# Patient Record
Sex: MALE | Race: WHITE | Employment: FULL TIME | URBAN - METROPOLITAN AREA
[De-identification: names, ages, dates, MRNs, and addresses within clinical notes are randomized per-mention and may not be internally consistent; named-entity substitution may affect disease eponyms.]

---

## 2021-08-02 ENCOUNTER — HOSPITAL ENCOUNTER (INPATIENT)
Age: 46
LOS: 1 days | Discharge: HOME | DRG: 066 | End: 2021-08-03
Attending: STUDENT IN AN ORGANIZED HEALTH CARE EDUCATION/TRAINING PROGRAM | Admitting: FAMILY MEDICINE
Payer: COMMERCIAL

## 2021-08-02 ENCOUNTER — APPOINTMENT (OUTPATIENT)
Dept: GENERAL RADIOLOGY | Age: 46
DRG: 066 | End: 2021-08-02
Attending: STUDENT IN AN ORGANIZED HEALTH CARE EDUCATION/TRAINING PROGRAM
Payer: COMMERCIAL

## 2021-08-02 ENCOUNTER — APPOINTMENT (OUTPATIENT)
Dept: MRI IMAGING | Age: 46
DRG: 066 | End: 2021-08-02
Attending: FAMILY MEDICINE
Payer: COMMERCIAL

## 2021-08-02 DIAGNOSIS — R94.31 ABNORMAL EKG: ICD-10-CM

## 2021-08-02 DIAGNOSIS — R55 SYNCOPE AND COLLAPSE: Primary | ICD-10-CM

## 2021-08-02 DIAGNOSIS — I63.9 ACUTE STROKE DUE TO ISCHEMIA (HCC): ICD-10-CM

## 2021-08-02 LAB
ANION GAP SERPL CALC-SCNC: 8 MMOL/L (ref 5–15)
ATRIAL RATE: 76 BPM
BASOPHILS # BLD: 0 K/UL (ref 0–0.1)
BASOPHILS NFR BLD: 0 % (ref 0–1)
BUN SERPL-MCNC: 20 MG/DL (ref 6–20)
BUN/CREAT SERPL: 16 (ref 12–20)
CALCIUM SERPL-MCNC: 8.9 MG/DL (ref 8.5–10.1)
CALCULATED P AXIS, ECG09: 84 DEGREES
CALCULATED R AXIS, ECG10: 82 DEGREES
CALCULATED T AXIS, ECG11: 62 DEGREES
CHLORIDE SERPL-SCNC: 104 MMOL/L (ref 97–108)
CO2 SERPL-SCNC: 28 MMOL/L (ref 21–32)
CREAT SERPL-MCNC: 1.28 MG/DL (ref 0.7–1.3)
DIAGNOSIS, 93000: NORMAL
DIFFERENTIAL METHOD BLD: ABNORMAL
EOSINOPHIL # BLD: 0 K/UL (ref 0–0.4)
EOSINOPHIL NFR BLD: 0 % (ref 0–7)
ERYTHROCYTE [DISTWIDTH] IN BLOOD BY AUTOMATED COUNT: 11.9 % (ref 11.5–14.5)
GLUCOSE SERPL-MCNC: 114 MG/DL (ref 65–100)
HCT VFR BLD AUTO: 43.1 % (ref 36.6–50.3)
HGB BLD-MCNC: 15.5 G/DL (ref 12.1–17)
IMM GRANULOCYTES # BLD AUTO: 0 K/UL (ref 0–0.04)
IMM GRANULOCYTES NFR BLD AUTO: 0 % (ref 0–0.5)
LYMPHOCYTES # BLD: 0.6 K/UL (ref 0.8–3.5)
LYMPHOCYTES NFR BLD: 10 % (ref 12–49)
MAGNESIUM SERPL-MCNC: 2.1 MG/DL (ref 1.6–2.4)
MCH RBC QN AUTO: 31.5 PG (ref 26–34)
MCHC RBC AUTO-ENTMCNC: 36 G/DL (ref 30–36.5)
MCV RBC AUTO: 87.6 FL (ref 80–99)
MONOCYTES # BLD: 0.4 K/UL (ref 0–1)
MONOCYTES NFR BLD: 6 % (ref 5–13)
NEUTS SEG # BLD: 5.2 K/UL (ref 1.8–8)
NEUTS SEG NFR BLD: 84 % (ref 32–75)
NRBC # BLD: 0 K/UL (ref 0–0.01)
NRBC BLD-RTO: 0 PER 100 WBC
P-R INTERVAL, ECG05: 204 MS
PLATELET # BLD AUTO: 156 K/UL (ref 150–400)
PMV BLD AUTO: 9.2 FL (ref 8.9–12.9)
POTASSIUM SERPL-SCNC: 4.5 MMOL/L (ref 3.5–5.1)
Q-T INTERVAL, ECG07: 384 MS
QRS DURATION, ECG06: 94 MS
QTC CALCULATION (BEZET), ECG08: 432 MS
RBC # BLD AUTO: 4.92 M/UL (ref 4.1–5.7)
RBC MORPH BLD: ABNORMAL
SODIUM SERPL-SCNC: 140 MMOL/L (ref 136–145)
TROPONIN I SERPL-MCNC: <0.05 NG/ML
VENTRICULAR RATE, ECG03: 76 BPM
WBC # BLD AUTO: 6.2 K/UL (ref 4.1–11.1)

## 2021-08-02 PROCEDURE — 74011250637 HC RX REV CODE- 250/637: Performed by: NURSE PRACTITIONER

## 2021-08-02 PROCEDURE — 74011250637 HC RX REV CODE- 250/637: Performed by: STUDENT IN AN ORGANIZED HEALTH CARE EDUCATION/TRAINING PROGRAM

## 2021-08-02 PROCEDURE — 74011000250 HC RX REV CODE- 250: Performed by: STUDENT IN AN ORGANIZED HEALTH CARE EDUCATION/TRAINING PROGRAM

## 2021-08-02 PROCEDURE — 83735 ASSAY OF MAGNESIUM: CPT

## 2021-08-02 PROCEDURE — 93005 ELECTROCARDIOGRAM TRACING: CPT

## 2021-08-02 PROCEDURE — 85025 COMPLETE CBC W/AUTO DIFF WBC: CPT

## 2021-08-02 PROCEDURE — 84484 ASSAY OF TROPONIN QUANT: CPT

## 2021-08-02 PROCEDURE — 74011250637 HC RX REV CODE- 250/637: Performed by: FAMILY MEDICINE

## 2021-08-02 PROCEDURE — 99254 IP/OBS CNSLTJ NEW/EST MOD 60: CPT | Performed by: SPECIALIST

## 2021-08-02 PROCEDURE — 70551 MRI BRAIN STEM W/O DYE: CPT

## 2021-08-02 PROCEDURE — 65270000029 HC RM PRIVATE

## 2021-08-02 PROCEDURE — 95816 EEG AWAKE AND DROWSY: CPT | Performed by: FAMILY MEDICINE

## 2021-08-02 PROCEDURE — 71046 X-RAY EXAM CHEST 2 VIEWS: CPT

## 2021-08-02 PROCEDURE — 99285 EMERGENCY DEPT VISIT HI MDM: CPT

## 2021-08-02 PROCEDURE — 80048 BASIC METABOLIC PNL TOTAL CA: CPT

## 2021-08-02 PROCEDURE — 36415 COLL VENOUS BLD VENIPUNCTURE: CPT

## 2021-08-02 RX ORDER — SODIUM CHLORIDE 0.9 % (FLUSH) 0.9 %
5-40 SYRINGE (ML) INJECTION EVERY 8 HOURS
Status: DISCONTINUED | OUTPATIENT
Start: 2021-08-02 | End: 2021-08-03 | Stop reason: HOSPADM

## 2021-08-02 RX ORDER — ACETAMINOPHEN 325 MG/1
650 TABLET ORAL
Status: DISCONTINUED | OUTPATIENT
Start: 2021-08-02 | End: 2021-08-03 | Stop reason: HOSPADM

## 2021-08-02 RX ORDER — PHENOL/SODIUM PHENOLATE
20 AEROSOL, SPRAY (ML) MUCOUS MEMBRANE DAILY
COMMUNITY
End: 2021-08-02

## 2021-08-02 RX ORDER — GUAIFENESIN 100 MG/5ML
81 LIQUID (ML) ORAL DAILY
Status: DISCONTINUED | OUTPATIENT
Start: 2021-08-02 | End: 2021-08-03 | Stop reason: HOSPADM

## 2021-08-02 RX ORDER — ONDANSETRON 2 MG/ML
4 INJECTION INTRAMUSCULAR; INTRAVENOUS
Status: DISCONTINUED | OUTPATIENT
Start: 2021-08-02 | End: 2021-08-03 | Stop reason: HOSPADM

## 2021-08-02 RX ORDER — SODIUM CHLORIDE 0.9 % (FLUSH) 0.9 %
5-40 SYRINGE (ML) INJECTION AS NEEDED
Status: DISCONTINUED | OUTPATIENT
Start: 2021-08-02 | End: 2021-08-03 | Stop reason: HOSPADM

## 2021-08-02 RX ORDER — ACETAMINOPHEN 650 MG/1
650 SUPPOSITORY RECTAL
Status: DISCONTINUED | OUTPATIENT
Start: 2021-08-02 | End: 2021-08-03 | Stop reason: HOSPADM

## 2021-08-02 RX ORDER — POLYETHYLENE GLYCOL 3350 17 G/17G
17 POWDER, FOR SOLUTION ORAL DAILY PRN
Status: DISCONTINUED | OUTPATIENT
Start: 2021-08-02 | End: 2021-08-03 | Stop reason: HOSPADM

## 2021-08-02 RX ORDER — ENOXAPARIN SODIUM 100 MG/ML
40 INJECTION SUBCUTANEOUS DAILY
Status: DISCONTINUED | OUTPATIENT
Start: 2021-08-03 | End: 2021-08-03 | Stop reason: HOSPADM

## 2021-08-02 RX ORDER — ACETAMINOPHEN 500 MG
1000 TABLET ORAL ONCE
Status: COMPLETED | OUTPATIENT
Start: 2021-08-02 | End: 2021-08-02

## 2021-08-02 RX ORDER — BACITRACIN 500 UNIT/G
1 PACKET (EA) TOPICAL
Status: COMPLETED | OUTPATIENT
Start: 2021-08-02 | End: 2021-08-02

## 2021-08-02 RX ORDER — ONDANSETRON 4 MG/1
4 TABLET, ORALLY DISINTEGRATING ORAL
Status: DISCONTINUED | OUTPATIENT
Start: 2021-08-02 | End: 2021-08-03 | Stop reason: HOSPADM

## 2021-08-02 RX ORDER — PANTOPRAZOLE SODIUM 40 MG/1
40 TABLET, DELAYED RELEASE ORAL
Status: DISCONTINUED | OUTPATIENT
Start: 2021-08-03 | End: 2021-08-03 | Stop reason: HOSPADM

## 2021-08-02 RX ORDER — OMEPRAZOLE 40 MG/1
40 CAPSULE, DELAYED RELEASE ORAL DAILY
COMMUNITY

## 2021-08-02 RX ADMIN — ACETAMINOPHEN 650 MG: 325 TABLET ORAL at 20:52

## 2021-08-02 RX ADMIN — BACITRACIN 1 PACKET: 500 OINTMENT TOPICAL at 15:01

## 2021-08-02 RX ADMIN — ASPIRIN 81 MG: 81 TABLET, CHEWABLE ORAL at 18:20

## 2021-08-02 RX ADMIN — ACETAMINOPHEN 650 MG: 325 TABLET ORAL at 14:47

## 2021-08-02 RX ADMIN — Medication 10 ML: at 20:53

## 2021-08-02 RX ADMIN — ACETAMINOPHEN 1000 MG: 500 TABLET ORAL at 09:46

## 2021-08-02 NOTE — ED PROVIDER NOTES
Patient is a 28-year-old male with history of GERD presenting after a syncopal episode while jogging. Patient states he was on run and been running for 20 to 30 minutes he started feeling lightheaded at which time he slowed to a walk and then syncopized. Patient does not know how long he was out. Patient hit the left side of his face left forearm and left knee. No significant deep injuries. Minor lacerations and abrasions to the left cheek, left wrist and left knee that are hemostatic at this time. Patient states that he had additional syncopal episodes approximately 1 month ago when he had a GI illness and passed out twice in the bathroom. At that time at [facility] received a CT scan, EKG other tests that showed no concerning findings and was diagnosed with vasovagal syncope. His father does have a heart history. The history is provided by the patient and the spouse. Syncope   This is a recurrent problem. The current episode started less than 1 hour ago. The problem occurs rarely. The problem has been resolved. Length of episode of loss of consciousness: Time of unconsciousness unknown likely 1 to 5 minutes. Associated with: Jogging. Associated symptoms include malaise/fatigue and light-headedness. Pertinent negatives include no chest pain, no palpitations, no congestion, no headaches, no back pain, no focal weakness, no seizures, no slurred speech, no melena and no head injury. He has tried nothing for the symptoms. His past medical history is significant for syncope. His past medical history does not include no CVA, no TIA, no CAD, no DM, no HTN, no vertigo, no seizures or no AICD. Past Medical History:   Diagnosis Date    GERD (gastroesophageal reflux disease)        History reviewed. No pertinent surgical history. History reviewed. No pertinent family history.     Social History     Socioeconomic History    Marital status:      Spouse name: Not on file    Number of children: Not on file    Years of education: Not on file    Highest education level: Not on file   Occupational History    Not on file   Tobacco Use    Smoking status: Never Smoker    Smokeless tobacco: Never Used   Substance and Sexual Activity    Alcohol use: Yes     Alcohol/week: 6.0 standard drinks     Types: 6 Cans of beer per week    Drug use: Never    Sexual activity: Not on file   Other Topics Concern    Not on file   Social History Narrative    Not on file     Social Determinants of Health     Financial Resource Strain:     Difficulty of Paying Living Expenses:    Food Insecurity:     Worried About Running Out of Food in the Last Year:     [address] in the Last Year:    Transportation Needs:     Lack of Transportation (Medical):  Lack of Transportation (Non-Medical):    Physical Activity:     Days of Exercise per Week:     Minutes of Exercise per Session:    Stress:     Feeling of Stress :    Social Connections:     Frequency of Communication with Friends and Family:     Frequency of Social Gatherings with Friends and Family:     Attends [employer] Services:     Active Member of Clubs or Organizations:     Attends Club or Organization Meetings:     Marital Status:    Intimate Partner Violence:     Fear of Current or Ex-Partner:     Emotionally Abused:     Physically Abused:     Sexually Abused: ALLERGIES: Patient has no known allergies. Review of Systems   Constitutional: Positive for fatigue and malaise/fatigue. HENT: Negative. Negative for congestion. Eyes: Negative. Respiratory: Negative. Cardiovascular: Positive for syncope. Negative for chest pain and palpitations. Gastrointestinal: Negative. Negative for melena. Endocrine: Negative. Genitourinary: Negative. Musculoskeletal: Negative. Negative for back pain. Skin: Negative. Allergic/Immunologic: Negative. Neurological: Positive for syncope and light-headedness.  Negative for focal weakness, seizures and headaches. Hematological: Negative. Psychiatric/Behavioral: Negative. Vitals:    08/02/21 2058 08/02/21 2100 08/02/21 2200 08/02/21 2233   BP: 116/72 118/72 124/66 119/78   Pulse: 68 70 65 75   Resp: 12 14 15 19   Temp:       SpO2: 97% 96% 96% 97%   Weight:       Height:                Physical Exam  Vitals and nursing note reviewed. Constitutional:        HENT:      Head: Normocephalic. Abrasion and contusion present. Jaw: There is normal jaw occlusion. Tenderness present. Right Ear: External ear normal.      Left Ear: External ear normal.      Nose: Nose normal.      Mouth/Throat:      Mouth: Mucous membranes are moist.      Pharynx: Oropharynx is clear. No posterior oropharyngeal erythema. Eyes:      Extraocular Movements: Extraocular movements intact. Conjunctiva/sclera: Conjunctivae normal.   Cardiovascular:      Rate and Rhythm: Normal rate. Pulses: Normal pulses. Pulmonary:      Effort: Pulmonary effort is normal.      Breath sounds: Normal breath sounds. Abdominal:      General: Abdomen is flat. Tenderness: There is no abdominal tenderness. Musculoskeletal:         General: Normal range of motion. Cervical back: Normal range of motion. Skin:     General: Skin is warm and dry. Capillary Refill: Capillary refill takes less than 2 seconds. Neurological:      General: No focal deficit present. Mental Status: He is alert and oriented to person, place, and time. Cranial Nerves: No cranial nerve deficit. Sensory: No sensory deficit. Motor: No weakness. Psychiatric:         Mood and Affect: Mood normal.         Behavior: Behavior normal.         Thought Content:  Thought content normal.         Judgment: Judgment normal.          MDM       LABORATORY RESULTS:  Labs Reviewed   METABOLIC PANEL, BASIC - Abnormal; Notable for the following components:       Result Value    Glucose 114 (*)     All other components within normal limits   CBC WITH AUTOMATED DIFF - Abnormal; Notable for the following components:    NEUTROPHILS 84 (*)     LYMPHOCYTES 10 (*)     ABS. LYMPHOCYTES 0.6 (*)     All other components within normal limits   TROPONIN I   MAGNESIUM       IMAGING RESULTS:  XR CHEST PA LAT   Final Result   No acute cardiopulmonary process. MEDICATIONS GIVEN:  Medications   acetaminophen (TYLENOL) tablet 1,000 mg (1,000 mg Oral Given 8/2/21 5747)   bacitracin 500 unit/gram packet 1 Packet (1 Packet Topical Given 8/2/21 4241)       Differential diagnosis: Recurrent syncope with exertion, abnormal EKG, head trauma    ED physician interpretation of imaging: Chest x-ray without hemopneumothorax. ED physician interpretation of EKG: No STEMI. Rhythm: normal sinus rhythm; and regular . Rate (approx.): 76; additionally possible left atrial lodgment as well as incomplete right bundle branch block. No previous EKGs to compare to. Reportedly  previous EKG was unremarkable per family. EKG documented and interpreted by Soumya Reveles. Marie Patel MD  ED physician interpretation of labs: Screening labs including troponin unremarkable. MDM: Patient is a generally healthy 57-year-old male presenting to the ED after a syncopal episode with collapse while jogging with previous syncopal episodes during a illness with vomiting with a abnormal EKG requiring hospital admission for further treatment evaluation of syncope. Patient's vital signs are stable, patient afebrile. Patient physical exam is remarkable for mild abrasions and lacerations to the face left forearm and left knee. These were clean but did not require any repair. Patient had work-up for syncope in the past with CT scan with no concerning findings. Patient had prodromal symptoms of feeling heavy prior to syncopal episode.     Plan and possible diagnoses discussed with family and patient they are comfortable with hospital admission for further treatment evaluation. DISPOSITION: Admit    [facility] Consult for Admission  1105 AM    ED Room Number: ER07/07  Patient Name and age:  Rashida Bowens 42 y.o.  male  Working Diagnosis:   1. Syncope and collapse    2. Abnormal EKG        COVID-19 Suspicion:  no  Sepsis present:  no  Reassessment needed: no  Code Status:  Full Code  Readmission: no  Isolation Requirements:  no  Recommended Level of Care:  telemetry  Department:  [facility] ED - [phone]    Other: Patient has had repeat syncopal episode with a abnormal EKG and a family heart history. No significant physical exam findings requiring initial CT of head or face at this time. Esperanza Fernandes.  Kamla Underwood MD          Procedures

## 2021-08-02 NOTE — CONSULTS
CARDIOLOGY CONSULTATION NOTE    Elmer Mars MD,  [address], [place], [address] [facility]  Phone [phone]; Fax [phone]  Mobile [phone]   Voice Mail [phone]                               2021  9:05 AM  Other, MD Smiley  :  1975   MRN:  [id number]     CC: Syncope    Reason for consult:  Syncope      Admission Diagnosis: Syncope [R55]         ATTENTION:   This medical record was transcribed using an electronic medical records/speech recognition system. Although proofread, it may and can contain electronic, spelling and other errors. Corrections may be executed at a later time. Please feel free to contact us for any clarifications as needed. Impression Plan/Recommendation   1. Syncope  2. Elevated cholesterol profile            ECG NSR with ICRBBB  H&H was 15/43, potassium was 4.5, BUN was 20, creatinine 1.28, and troponin was less than 0.05, chest x-ray no acute cardiopulmonary process 1.  he had an episode of syncope when he was nauseated and another episode while he was running. I think that we should go forward with a echocardiogram to look for potential structural heart disease  2. Distress to see if we can provoke any arrhythmia  3. Will place a loop monitor to look for any arrhythmias to keep her overnight on the monitor             Nu Ortiz is a 42 y.o. male I am seeing for syncope. He said he was jogging and suddenly felt dizzy. He has no prior cardiac history he has possibly some elevated cholesterol but is being treated with diet for the time being. He is also had episode of syncope in July when he was nauseated. I believe it may be autonomic insufficiency. He is active and runs regularly. At least 3 days a week. Denies any chest discomfort except for some reflux. Cardiac risk factors: dyslipidemia, male gender.         No Known Allergies      Past Medical History:   Diagnosis Date    GERD (gastroesophageal reflux disease)         History reviewed. No pertinent surgical history. .Home Medications:  Prior to Admission Medications   Prescriptions Last Dose Informant Patient Reported? Taking? Omeprazole delayed release (PRILOSEC D/R) 20 mg tablet 8/1/2021 at Unknown time  Yes Yes   Sig: Take 20 mg by mouth daily. Facility-Administered Medications: None       Hospital Medications:  Current Facility-Administered Medications   Medication Dose Route Frequency    sodium chloride (NS) flush 5-40 mL  5-40 mL IntraVENous Q8H    sodium chloride (NS) flush 5-40 mL  5-40 mL IntraVENous PRN    acetaminophen (TYLENOL) tablet 650 mg  650 mg Oral Q6H PRN    Or    acetaminophen (TYLENOL) suppository 650 mg  650 mg Rectal Q6H PRN    polyethylene glycol (MIRALAX) packet 17 g  17 g Oral DAILY PRN    ondansetron (ZOFRAN ODT) tablet 4 mg  4 mg Oral Q8H PRN    Or    ondansetron (ZOFRAN) injection 4 mg  4 mg IntraVENous Q6H PRN    [START ON 8/3/2021] enoxaparin (LOVENOX) injection 40 mg  40 mg SubCUTAneous DAILY    [START ON 8/3/2021] pantoprazole (PROTONIX) tablet 40 mg  40 mg Oral ACB     Current Outpatient Medications   Medication Sig    Omeprazole delayed release (PRILOSEC D/R) 20 mg tablet Take 20 mg by mouth daily. OBJECTIVE       Laboratory and Imaging have been reviewed and are notable for      ECG:        Diagnostic Tests:     Recent Labs     08/02/21  0945   TROIQ <0.05     Recent Labs     08/02/21  0945      K 4.5   CO2 28   BUN 20   CREA 1.28   *   MG 2.1   WBC 6.2   HGB 15.5   HCT 43.1            Cardiac work up to date:  No specialty comments available. Social History:  Social History     Tobacco Use    Smoking status: Never Smoker    Smokeless tobacco: Never Used   Substance Use Topics    Alcohol use: Yes     Alcohol/week: 6.0 standard drinks     Types: 6 Cans of beer per week       Family History:  History reviewed. No pertinent family history.     Review of Symptoms:  A comprehensive review of systems was negative except for that written in the HPI. Physical Exam:      Visit Vitals  /78   Pulse 78   Temp 98.2 °F (36.8 °C)   Resp 16   Ht 6' 4\" (1.93 m)   Wt 180 lb (81.6 kg)   SpO2 100%   BMI 21.91 kg/m²     General Appearance:  Well developed, well nourished,alert and oriented x 3, and individual in no acute distress. Ears/Nose/Mouth/Throat:   Hearing grossly normal.Normal oral mucosa,no scleral icterus     Neck: Supple no JVD or bruits,no cervical lymphadenopathy   Chest:   Lungs clear to auscultation bilaterally,no rales rhonchi or wheezing   Cardiovascular:  Regular rate and rhythm, S1, S2 normal,  Murmur. PMI nondisplaced   Abdomen:   Soft, non-tender, bowel sounds are active. No abdominal bruits   Extremities: No edema bilaterally. Pulses detected, no varicosities   Skin: Warm and dry. No bruising  Neuro  Moves all extermities and neurologically intact                                                       I have discussed the diagnosis with the patient and the intended plan as seen in the above orders. Questions were answered concerning future plans. I have discussed medication side effects and warnings with the patient as well. Maranda Giraldo is in agreement to the plan listed above and wishes to proceed. he  was instructed not to smoke, eat heart healthy diet  and to exercise. Thank you for the consult and the opportunity to be involved in the care of Maranda Giraldo.         Cosme Jesus MD

## 2021-08-02 NOTE — ED TRIAGE NOTES
Patient presents to treatment area via wheelchair. Patient states that he was jogging this morning when he began feeling dizzy. Patient states this is the last thing he remembers; awoke on the ground. Patient has abrasions to left side of face, bilateral knees, and bilateral arms. Patient denies nausea or vomiting since the incident. Patient states he has had a spell like this in the past while he was sitting on the toilet about two weeks PTA. Patient did not see a medical professional after the initial incident.

## 2021-08-02 NOTE — H&P
[address] Adult  Hospitalist Group    History & Physical    Date of service: 8/2/2021    Patient name: Brandon Ruiz  MRN: [id number]  YOB: 1975  Age: 42 y.o. Primary care provider:  Smiley Guzman MD     Source of Information: patient, medical records                            Chief complain: Syncope    History of present illness  Brandon Ruiz is a 42 y.o. male who presented with a syncopal episode this morning. It occurred while he was jogging and he began to feel dizzy and then woke up on the ground. He isn't sure how long he was out for, but estimates probably minutes. His neighbor went out to check on him and when he came to his wife was also checking on him. No loss of bowel or bladder, no tongue biting. He denies any nausea or vomiting. Similar event occurred about 2 weeks ago while he was sitting on the toilet and did not seek medical attention at that time. He has a significant family history of heart disease. He reports some pain/numbness on the left side of his face, but no other complaints. Past Medical History:   Diagnosis Date    GERD (gastroesophageal reflux disease)       History reviewed. No pertinent surgical history. Prior to Admission medications    Medication Sig Start Date End Date Taking? Authorizing Provider   Omeprazole delayed release (PRILOSEC D/R) 20 mg tablet Take 20 mg by mouth daily. Yes Megan, MD Smiley     No Known Allergies   History reviewed. No pertinent family history. Social history    Social History     Tobacco Use   Smoking Status Never Smoker   Smokeless Tobacco Never Used       Social History     Substance and Sexual Activity   Alcohol Use Yes    Alcohol/week: 6.0 standard drinks    Types: 6 Cans of beer per week       Code status  Code status discussed with the patient/caregivers.   Full Code    Review of systems    A comprehensive review of systems was negative except for that written in the History of Present Illness. Physical Examination   Visit Vitals  /78   Pulse 78   Temp 98.2 °F (36.8 °C)   Resp 16   Ht 6' 4\" (1.93 m)   Wt 81.6 kg (180 lb)   SpO2 99%   BMI 21.91 kg/m²          O2 Device: None    General:  Alert, cooperative, no distress   Head:  Normocephalic, without obvious abnormality, atraumatic   Eyes:  Conjunctivae/corneas clear.  PERRL, EOMs intact   Head/Neck: Nares normal. No nasal drainage or sinus tenderness  Lips, mucosa, and tongue normal   Teeth and gums normal  Clear oropharynx  Trachea midline  No palpable adenopathy  No thyroid enlargement, tenderness     Lungs:   Symmetrical chest expansion and respiratory effort  Clear to auscultation bilaterally       Heart:  Regular rhythm   Sounds normal; no murmur, rub or gallop   Abdomen:   Soft, no tenderness  Bowel sounds normal  No masses or hepatosplenomegaly     Back: No CVA tenderness   Extremities: Extremities normal, atraumatic  No cyanosis or edema     Skin: No rashes or ulcers   Musculo-      skeletal: Gait not tested  Normal symmetry, ROM, strength and tone   Neuro: Cranial nerves grossly normal     Psych: Alert, oriented x3  Normal affect, judgement and insight     Data Review    24 Hour Results:  Recent Results (from the past 24 hour(s))   EKG, 12 LEAD, INITIAL    Collection Time: 08/02/21  9:10 AM   Result Value Ref Range    Ventricular Rate 76 BPM    Atrial Rate 76 BPM    P-R Interval 204 ms    QRS Duration 94 ms    Q-T Interval 384 ms    QTC Calculation (Bezet) 432 ms    Calculated P Axis 84 degrees    Calculated R Axis 82 degrees    Calculated T Axis 62 degrees    Diagnosis       Normal sinus rhythm with 1st degree AV block  Possible Left atrial enlargement  Incomplete right bundle branch block  Borderline ECG    Confirmed by Mali Meza MD, JUAN DANIEL ([id number]) on 8/2/2021 11:14:44 AM     TROPONIN I    Collection Time: 08/02/21  9:45 AM   Result Value Ref Range    Troponin-I, Qt. <0.05 <0.05 ng/mL   MAGNESIUM    Collection Time: 08/02/21 9:45 AM   Result Value Ref Range    Magnesium 2.1 1.6 - 2.4 mg/dL   METABOLIC PANEL, BASIC    Collection Time: 08/02/21  9:45 AM   Result Value Ref Range    Sodium 140 136 - 145 mmol/L    Potassium 4.5 3.5 - 5.1 mmol/L    Chloride 104 97 - 108 mmol/L    CO2 28 21 - 32 mmol/L    Anion gap 8 5 - 15 mmol/L    Glucose 114 (H) 65 - 100 mg/dL    BUN 20 6 - 20 MG/DL    Creatinine 1.28 0.70 - 1.30 MG/DL    BUN/Creatinine ratio 16 12 - 20      GFR est AA >60 >60 ml/min/1.73m2    GFR est non-AA >60 >60 ml/min/1.73m2    Calcium 8.9 8.5 - 10.1 MG/DL   CBC WITH AUTOMATED DIFF    Collection Time: 08/02/21  9:45 AM   Result Value Ref Range    WBC 6.2 4.1 - 11.1 K/uL    RBC 4.92 4.10 - 5.70 M/uL    HGB 15.5 12.1 - 17.0 g/dL    HCT 43.1 36.6 - 50.3 %    MCV 87.6 80.0 - 99.0 FL    MCH 31.5 26.0 - 34.0 PG    MCHC 36.0 30.0 - 36.5 g/dL    RDW 11.9 11.5 - 14.5 %    PLATELET 429 525 - 524 K/uL    MPV 9.2 8.9 - 12.9 FL    NRBC 0.0 0.0  WBC    ABSOLUTE NRBC 0.00 0.00 - 0.01 K/uL    NEUTROPHILS 84 (H) 32 - 75 %    LYMPHOCYTES 10 (L) 12 - 49 %    MONOCYTES 6 5 - 13 %    EOSINOPHILS 0 0 - 7 %    BASOPHILS 0 0 - 1 %    IMMATURE GRANULOCYTES 0 0 - 0.5 %    ABS. NEUTROPHILS 5.2 1.8 - 8.0 K/UL    ABS. LYMPHOCYTES 0.6 (L) 0.8 - 3.5 K/UL    ABS. MONOCYTES 0.4 0.0 - 1.0 K/UL    ABS. EOSINOPHILS 0.0 0.0 - 0.4 K/UL    ABS. BASOPHILS 0.0 0.0 - 0.1 K/UL    ABS. IMM. GRANS. 0.0 0.00 - 0.04 K/UL    DF SMEAR SCANNED      RBC COMMENTS NORMOCYTIC, NORMOCHROMIC       Recent Labs     08/02/21  0945   WBC 6.2   HGB 15.5   HCT 43.1        Recent Labs     08/02/21  0945      K 4.5      CO2 28   *   BUN 20   CREA 1.28   CA 8.9   MG 2.1       Imaging  XR CHEST PA LAT    Result Date: 8/2/2021  EXAM: XR CHEST PA LAT HISTORY: syncope. COMPARISON: None FINDINGS: 2 view(s) of the chest. The lungs are well inflated. No focal consolidation, pleural effusion, or pneumothorax. The cardiomediastinal silhouette is unremarkable.  The visualized osseous structures are unremarkable. No acute cardiopulmonary process. Assessment and Plan     Syncope  -Concerning for cardiac origin  -Monitor on telemetry, check echo  -EKG shows first-degree AV block and incomplete right bundle branch  -Consult cardiology for further evaluation  -check MRI, EEG, neuro consult    GERD  -Continue PPI    Diet: Regular  Activity: As tolerated  DVT prophylaxis: Lovenox  Isolation precautions: None       Signed by:  Virgilio Parra MD    August 2, 2021 at 2:58 PM

## 2021-08-02 NOTE — CONSULTS
LAUREN [facility]OU[facility]: [address] Neurology  Chi Lazcano  [phone]      Name:   Bay Felipe   Medical record #: [id number]  Admission Date: 8/2/2021     Who Consulted: Dr. Arsenio Patterson    Reason for Consult:  syncope    HISTORY OF PRESENT ILLNESS:     This is a 42 y.o. male who is admitted for syncope. Mr. Alli Rangel presented to the ED after an episode in which began feeling dizzy while jogging. He next woke up on the ground and does not remember how he got there. He denies loss of bowl/bladder or biting his tongue. He reported a similar episode 2 weeks ago while sitting on the toilet. Upon arrival to the ED he was conversant with a Bp of 142/94. The Neurology Service is asked to evaluate for syncope. EKG: Normal sinus rhythm with 1st degree AV block    Care Plan discussed with:  Patient x   Family    RN    Care Manager    Consultant/Specialist:         Thank you for allowing the Neurology Service the pleasure of participating in the care of your patient. This patient will be discussed with my collaborating care team physician,  Dr. Yumiko Marroquin and he may have further recommendations regarding this patient's care      Nora Ramires, ACNP-BC  ====================      Attending Attestation:       I have reviewed the documentation provided by the nurse practitioner, Mrs. Claudia Nava, and we have discussed her findings and the clinical impression. I have formulated with her the proposed management plans for this patient. Additionally,  I have personally evaluated the patient to verify the history and to confirm physical findings. Below are my additional comments:    31-year-old gentleman who is seen today company by his wife. I personally reviewed his images with him and his wife. In any regard he had an episode of loss of consciousness while jogging.   He had a separate episode about a month ago and seen at [facility].  His MRI does show indeed a stroke bright on diffusion-weighted imaging and dark on ADC map in the left frontal region quite small but it is indeed a stroke. We discussed this today at length. He is still complaining of left facial numbness which is likely secondary to his injury. He should not be numb in the left side of the face from a left frontal stroke. His examination is completely nonfocal.  He is awake alert oriented and conversant. Speech and language normal.  Cognition normal.  Cranial nerves intact. Motor is full throughout. No ataxia. We have started him on aspirin. His LDL was elevated in the 90s. He has been started on Lipitor. Hypercoagulability profile ordered. CTA of the head and neck ordered. He has his 30-day Holter monitor on. He will need to be evaluated with that for paroxysmal atrial fibrillation and if that is unremarkable he will need ILR implantation. We discussed the driving restriction of [place]. He will follow with us in the office in 30 days and will follow with cardiology. He is to follow with Dr. Jessie Zambrano primary care as well. Good questions answered today. Christy Soares MD                         Assessment/Plan:     1. Syncope, r/o Stroke:    · ASA 81 mg  · Neurochecks:  Every 4 hours  · Blood Sugar Goal:  140-180  · BP Goal: Less than 180/105 for 24 hours  · Nursing to do stroke/TIA education  · Telemetry for at least 24 hours  · Routine EEG    Stroke work up  · A1C:  · LDL:    · TTE:    · MRI:  Tiny focus of restricted diffusion in the anterior left frontal lobe white matter. May represent tiny infarct  · Carotid vascular imaging:    Risk factors for stroke include: none  · Discussed with patient    · Discussed signs/symptoms of stroke and when to call 911    2. Mobility:   · Has been OOB. · PT/OT to eval for rehab    3. Diet:    · Given medications without STAND     4. VTE Prophylaxes:   · Per Primary team           Review of Systems: 10 point ROS was performed.   Pertinent positives listed in HPI.  Negative ROS is as follows. Pt denies: angina, palpitations, paresthesias, weakness, vision loss, slurred speech, aphasia, confusion, fever, chills, falls, headache, diplopia, back pain, neck pain, prior episodes of vertigo, hallucinations, new medications or dosage changes. Allergies:   No Known Allergies    Outpatient Meds  No current facility-administered medications on file prior to encounter. Current Outpatient Medications on File Prior to Encounter   Medication Sig Dispense Refill    Omeprazole delayed release (PRILOSEC D/R) 20 mg tablet Take 20 mg by mouth daily. Inpatient Meds    Current Facility-Administered Medications   Medication Dose Route Frequency Provider Last Rate Last Admin    sodium chloride (NS) flush 5-40 mL  5-40 mL IntraVENous Q8H Al Urrutia MD        sodium chloride (NS) flush 5-40 mL  5-40 mL IntraVENous PRN Al Urrutia MD        acetaminophen (TYLENOL) tablet 650 mg  650 mg Oral Q6H PRN Al Urrutia MD   650 mg at 08/02/21 1447    Or    acetaminophen (TYLENOL) suppository 650 mg  650 mg Rectal Q6H PRN Al Urrutia MD        polyethylene glycol (MIRALAX) packet 17 g  17 g Oral DAILY PRN Al Urrutia MD        ondansetron (ZOFRAN ODT) tablet 4 mg  4 mg Oral Q8H PRN Al Urrutia MD        Or    ondansetron [facility]) injection 4 mg  4 mg IntraVENous Q6H PRN MD Matilda Liriano ON 8/3/2021] enoxaparin (LOVENOX) injection 40 mg  40 mg SubCUTAneous DAILY MD Matilda Liriano ON 8/3/2021] pantoprazole (PROTONIX) tablet 40 mg  40 mg Oral ACB Al Urrutia MD         Current Outpatient Medications   Medication Sig Dispense Refill    Omeprazole delayed release (PRILOSEC D/R) 20 mg tablet Take 20 mg by mouth daily. Past Medical History:   Diagnosis Date    GERD (gastroesophageal reflux disease)        History reviewed. No pertinent surgical history. family history is not on file. reports that he has never smoked.  He has never used smokeless tobacco. He reports current alcohol use of about 6.0 standard drinks of alcohol per week. He reports that he does not use drugs.            Lab Results (last 24 hrs)  Recent Results (from the past 24 hour(s))   EKG, 12 LEAD, INITIAL    Collection Time: 08/02/21  9:10 AM   Result Value Ref Range    Ventricular Rate 76 BPM    Atrial Rate 76 BPM    P-R Interval 204 ms    QRS Duration 94 ms    Q-T Interval 384 ms    QTC Calculation (Bezet) 432 ms    Calculated P Axis 84 degrees    Calculated R Axis 82 degrees    Calculated T Axis 62 degrees    Diagnosis       Normal sinus rhythm with 1st degree AV block  Possible Left atrial enlargement  Incomplete right bundle branch block  Borderline ECG    Confirmed by Ambar Reis MD, JUAN DANIEL ([id number]) on 8/2/2021 11:14:44 AM     TROPONIN I    Collection Time: 08/02/21  9:45 AM   Result Value Ref Range    Troponin-I, Qt. <0.05 <0.05 ng/mL   MAGNESIUM    Collection Time: 08/02/21  9:45 AM   Result Value Ref Range    Magnesium 2.1 1.6 - 2.4 mg/dL   METABOLIC PANEL, BASIC    Collection Time: 08/02/21  9:45 AM   Result Value Ref Range    Sodium 140 136 - 145 mmol/L    Potassium 4.5 3.5 - 5.1 mmol/L    Chloride 104 97 - 108 mmol/L    CO2 28 21 - 32 mmol/L    Anion gap 8 5 - 15 mmol/L    Glucose 114 (H) 65 - 100 mg/dL    BUN 20 6 - 20 MG/DL    Creatinine 1.28 0.70 - 1.30 MG/DL    BUN/Creatinine ratio 16 12 - 20      GFR est AA >60 >60 ml/min/1.73m2    GFR est non-AA >60 >60 ml/min/1.73m2    Calcium 8.9 8.5 - 10.1 MG/DL   CBC WITH AUTOMATED DIFF    Collection Time: 08/02/21  9:45 AM   Result Value Ref Range    WBC 6.2 4.1 - 11.1 K/uL    RBC 4.92 4.10 - 5.70 M/uL    HGB 15.5 12.1 - 17.0 g/dL    HCT 43.1 36.6 - 50.3 %    MCV 87.6 80.0 - 99.0 FL    MCH 31.5 26.0 - 34.0 PG    MCHC 36.0 30.0 - 36.5 g/dL    RDW 11.9 11.5 - 14.5 %    PLATELET 069 681 - 842 K/uL    MPV 9.2 8.9 - 12.9 FL    NRBC 0.0 0.0  WBC    ABSOLUTE NRBC 0.00 0.00 - 0.01 K/uL    NEUTROPHILS 84 (H) 32 - 75 % LYMPHOCYTES 10 (L) 12 - 49 %    MONOCYTES 6 5 - 13 %    EOSINOPHILS 0 0 - 7 %    BASOPHILS 0 0 - 1 %    IMMATURE GRANULOCYTES 0 0 - 0.5 %    ABS. NEUTROPHILS 5.2 1.8 - 8.0 K/UL    ABS. LYMPHOCYTES 0.6 (L) 0.8 - 3.5 K/UL    ABS. MONOCYTES 0.4 0.0 - 1.0 K/UL    ABS. EOSINOPHILS 0.0 0.0 - 0.4 K/UL    ABS. BASOPHILS 0.0 0.0 - 0.1 K/UL    ABS. IMM.  GRANS. 0.0 0.00 - 0.04 K/UL    DF SMEAR SCANNED      RBC COMMENTS NORMOCYTIC, NORMOCHROMIC

## 2021-08-02 NOTE — PROGRESS NOTES
CARE MANAGEMENT INITIAL ASSESSEMENT      NAME:   Nu Ortiz   :     1975   MRN:     [id number]       Emergency Contact:  Extended Emergency Contact Information  Primary Emergency Contact: Jordyn Vannesa  Mobile Phone: [phone]  Relation: Spouse    Advance Directive:  Full Code, does not have an advance directive. Adriana Montoya Healthcare Decision Maker:   Kristal Giron- spouse- [phone]    Reason for Admission:  Mr. Karla Pond is a 42 y.o. male with history that includes GERD  who was emergently admitted for:  syncope    Patient Active Problem List   Diagnosis Code    Syncope R55       Assessment: In person with patient and spouse Kristal Giron. RUR:  7%  Risk Level:  Low  Value-based purchasing:   No  Bundle patient:  No    Residency:  Private residence  Exterior Steps:  2-3  Interior Steps:  13    Lives With:  Spouse    Prior functioning:  Independent. Patient requires assistance with:  N/A    Prior DME required:  None    DME available:  None    Rehab history:  None    Discharge Concerns:  None      Insurer:  Payor: BLUE CROSS / Plan: [address] [place] [address] / Product Type: PPO /     PCP: Dr. Alexandro Bonner   Name of Practice:  [facility]   Current patient: Yes   Approximate date of last visit: a couple months ago   Access to virtual PCP visits:   No    Pharmacy:  [address] [place] [address]. Pt denies any problems obtaining/taking medications. COVID-19 vaccination status:  Fully vaccinated on  Transport:  Family      Transition of care plan:  Home with outpatient follow-up     Comments:   CM met with Pt to complete initial assessment. Pt states that he lives at home with his spouse. Pt has no hx of HH, home O2, or equipment. Pt is independent with ADLs and ambulation. Pt denies problems with either. Discharge plan is for Pt to return home.  Pt states family will transport him home.     _____________________________________  Fortino Robles, [address] [place] [address] Drive Management  2021   6:29 PM      Care Management Interventions  PCP Verified by CM: Yes (Dr. Elyssa Duvall MD)  Mode of Transport at Discharge:  Other (see comment) (family)  Transition of Care Consult (CM Consult): Discharge Planning  MyChart Signup: No  Discharge Durable Medical Equipment: No  Physical Therapy Consult: No  Occupational Therapy Consult: No  Speech Therapy Consult: No  Current Support Network: Lives with Spouse, Own Home  Confirm Follow Up Transport: Family  Discharge Location  Discharge Placement: Home with outpatient services

## 2021-08-02 NOTE — PROGRESS NOTES
[facility]: MED RECONCILIATION    Comments/Recommendations:   Prior to admission medication list updated by RN. Pharmacist did NOT interview patient regarding prior to admission medications. Pharmacist reviewed [address] [address]. Based on RxQuery data, pharmacist adjusted Omeprazole dose from 20 mg to 40 mg. Pharmacist did not make any other changes to prior to admission medication list.    Prior to Admission Medications   Prescriptions Last Dose Informant Patient Reported? Taking?   omeprazole (PRILOSEC) 40 mg capsule 8/1/2021 at Unknown time Other Yes Yes   Sig: Take 40 mg by mouth daily.         Yumiko Walsh Pharmacist

## 2021-08-02 NOTE — ED NOTES
Pt ambulatory to BR with steady gait. Arrived back to stretcher, wound cleansed and dressed. Vitals stable, no acute distress.

## 2021-08-03 ENCOUNTER — APPOINTMENT (OUTPATIENT)
Dept: CT IMAGING | Age: 46
DRG: 066 | End: 2021-08-03
Attending: NURSE PRACTITIONER
Payer: COMMERCIAL

## 2021-08-03 ENCOUNTER — APPOINTMENT (OUTPATIENT)
Dept: NON INVASIVE DIAGNOSTICS | Age: 46
DRG: 066 | End: 2021-08-03
Attending: SPECIALIST
Payer: COMMERCIAL

## 2021-08-03 VITALS
HEART RATE: 62 BPM | RESPIRATION RATE: 18 BRPM | BODY MASS INDEX: 21.98 KG/M2 | WEIGHT: 180.5 LBS | SYSTOLIC BLOOD PRESSURE: 114 MMHG | TEMPERATURE: 98.1 F | OXYGEN SATURATION: 98 % | HEIGHT: 76 IN | DIASTOLIC BLOOD PRESSURE: 73 MMHG

## 2021-08-03 PROBLEM — I63.9 ACUTE STROKE DUE TO ISCHEMIA (HCC): Status: ACTIVE | Noted: 2021-08-03

## 2021-08-03 LAB
ALBUMIN SERPL-MCNC: 4 G/DL (ref 3.5–5)
ALBUMIN/GLOB SERPL: 1.4 {RATIO} (ref 1.1–2.2)
ALP SERPL-CCNC: 70 U/L (ref 45–117)
ALT SERPL-CCNC: 93 U/L (ref 12–78)
ANION GAP SERPL CALC-SCNC: 4 MMOL/L (ref 5–15)
ANION GAP SERPL CALC-SCNC: 4 MMOL/L (ref 5–15)
AST SERPL-CCNC: 107 U/L (ref 15–37)
BASOPHILS # BLD: 0 K/UL (ref 0–0.1)
BASOPHILS NFR BLD: 0 % (ref 0–1)
BILIRUB SERPL-MCNC: 0.9 MG/DL (ref 0.2–1)
BUN SERPL-MCNC: 14 MG/DL (ref 6–20)
BUN SERPL-MCNC: 14 MG/DL (ref 6–20)
BUN/CREAT SERPL: 13 (ref 12–20)
BUN/CREAT SERPL: 13 (ref 12–20)
CALCIUM SERPL-MCNC: 8.4 MG/DL (ref 8.5–10.1)
CALCIUM SERPL-MCNC: 9 MG/DL (ref 8.5–10.1)
CHLORIDE SERPL-SCNC: 109 MMOL/L (ref 97–108)
CHLORIDE SERPL-SCNC: 109 MMOL/L (ref 97–108)
CHOLEST SERPL-MCNC: 163 MG/DL
CO2 SERPL-SCNC: 27 MMOL/L (ref 21–32)
CO2 SERPL-SCNC: 27 MMOL/L (ref 21–32)
COMMENT, HOLDF: NORMAL
CREAT SERPL-MCNC: 1.06 MG/DL (ref 0.7–1.3)
CREAT SERPL-MCNC: 1.1 MG/DL (ref 0.7–1.3)
DIFFERENTIAL METHOD BLD: ABNORMAL
ECHO AO ASC DIAM: 3.34 CM
ECHO AO ROOT DIAM: 3.6 CM
ECHO AV AREA PEAK VELOCITY: 4.06 CM2
ECHO AV AREA/BSA PEAK VELOCITY: 1.9 CM2/M2
ECHO AV PEAK GRADIENT: 3.92 MMHG
ECHO AV PEAK VELOCITY: 99.05 CM/S
ECHO IVC PROX: 1.98 CM
ECHO LA AREA 4C: 12.68 CM2
ECHO LA MAJOR AXIS: 2.56 CM
ECHO LA MINOR AXIS: 1.21 CM
ECHO LA VOL 2C: 28.2 ML (ref 18–58)
ECHO LA VOL 4C: 25.65 ML (ref 18–58)
ECHO LA VOL BP: 32.73 ML (ref 18–58)
ECHO LA VOL/BSA BIPLANE: 15.44 ML/M2 (ref 16–28)
ECHO LA VOLUME INDEX A2C: 13.3 ML/M2 (ref 16–28)
ECHO LA VOLUME INDEX A4C: 12.1 ML/M2 (ref 16–28)
ECHO LV E' LATERAL VELOCITY: 14.64 CENTIMETER/SECOND
ECHO LV E' SEPTAL VELOCITY: 13.92 CENTIMETER/SECOND
ECHO LV INTERNAL DIMENSION DIASTOLIC: 4.54 CM (ref 4.2–5.9)
ECHO LV INTERNAL DIMENSION SYSTOLIC: 3.2 CM
ECHO LV IVSD: 0.81 CM (ref 0.6–1)
ECHO LV MASS 2D: 111.6 G (ref 88–224)
ECHO LV MASS INDEX 2D: 52.6 G/M2 (ref 49–115)
ECHO LV POSTERIOR WALL DIASTOLIC: 0.75 CM (ref 0.6–1)
ECHO LVOT DIAM: 2.42 CM
ECHO LVOT PEAK GRADIENT: 3.04 MMHG
ECHO LVOT PEAK VELOCITY: 87.19 CM/S
ECHO MV A VELOCITY: 53.52 CENTIMETER/SECOND
ECHO MV AREA PHT: 2.7 CM2
ECHO MV E DECELERATION TIME (DT): 281.08 MS
ECHO MV E VELOCITY: 72.63 CENTIMETER/SECOND
ECHO MV PRESSURE HALF TIME (PHT): 81.51 MS
ECHO PV MAX VELOCITY: 85.53 CM/S
ECHO PV PEAK INSTANTANEOUS GRADIENT SYSTOLIC: 2.93 MMHG
ECHO RV INTERNAL DIMENSION: 2.99 CM
ECHO RV TAPSE: 2.23 CM (ref 1.5–2)
ECHO TV REGURGITANT MAX VELOCITY: 227.15 CM/S
ECHO TV REGURGITANT PEAK GRADIENT: 20.64 MMHG
EOSINOPHIL # BLD: 0.1 K/UL (ref 0–0.4)
EOSINOPHIL NFR BLD: 1 % (ref 0–7)
ERYTHROCYTE [DISTWIDTH] IN BLOOD BY AUTOMATED COUNT: 11.9 % (ref 11.5–14.5)
ERYTHROCYTE [DISTWIDTH] IN BLOOD BY AUTOMATED COUNT: 12 % (ref 11.5–14.5)
EST. AVERAGE GLUCOSE BLD GHB EST-MCNC: 94 MG/DL
FACT VIII ACT/NOR PPP: 123 % (ref 80–200)
GLOBULIN SER CALC-MCNC: 2.8 G/DL (ref 2–4)
GLUCOSE SERPL-MCNC: 107 MG/DL (ref 65–100)
GLUCOSE SERPL-MCNC: 108 MG/DL (ref 65–100)
HBA1C MFR BLD: 4.9 % (ref 4–5.6)
HCT VFR BLD AUTO: 41.9 % (ref 36.6–50.3)
HCT VFR BLD AUTO: 43.4 % (ref 36.6–50.3)
HDLC SERPL-MCNC: 48 MG/DL
HDLC SERPL: 3.4 {RATIO} (ref 0–5)
HGB BLD-MCNC: 14.9 G/DL (ref 12.1–17)
HGB BLD-MCNC: 15.4 G/DL (ref 12.1–17)
IMM GRANULOCYTES # BLD AUTO: 0 K/UL (ref 0–0.04)
IMM GRANULOCYTES NFR BLD AUTO: 0 % (ref 0–0.5)
LA VOL DISK BP: 28 ML (ref 18–58)
LDLC SERPL CALC-MCNC: 97 MG/DL (ref 0–100)
LYMPHOCYTES # BLD: 1.1 K/UL (ref 0.8–3.5)
LYMPHOCYTES NFR BLD: 20 % (ref 12–49)
MCH RBC QN AUTO: 31.5 PG (ref 26–34)
MCH RBC QN AUTO: 31.7 PG (ref 26–34)
MCHC RBC AUTO-ENTMCNC: 35.5 G/DL (ref 30–36.5)
MCHC RBC AUTO-ENTMCNC: 35.6 G/DL (ref 30–36.5)
MCV RBC AUTO: 88.8 FL (ref 80–99)
MCV RBC AUTO: 89.1 FL (ref 80–99)
MONOCYTES # BLD: 0.3 K/UL (ref 0–1)
MONOCYTES NFR BLD: 6 % (ref 5–13)
NEUTS SEG # BLD: 3.9 K/UL (ref 1.8–8)
NEUTS SEG NFR BLD: 73 % (ref 32–75)
NRBC # BLD: 0 K/UL (ref 0–0.01)
NRBC # BLD: 0 K/UL (ref 0–0.01)
NRBC BLD-RTO: 0 PER 100 WBC
NRBC BLD-RTO: 0 PER 100 WBC
PLATELET # BLD AUTO: 162 K/UL (ref 150–400)
PLATELET # BLD AUTO: 164 K/UL (ref 150–400)
PMV BLD AUTO: 8.6 FL (ref 8.9–12.9)
PMV BLD AUTO: 8.9 FL (ref 8.9–12.9)
POTASSIUM SERPL-SCNC: 4 MMOL/L (ref 3.5–5.1)
POTASSIUM SERPL-SCNC: 4 MMOL/L (ref 3.5–5.1)
PROT SERPL-MCNC: 6.8 G/DL (ref 6.4–8.2)
RBC # BLD AUTO: 4.7 M/UL (ref 4.1–5.7)
RBC # BLD AUTO: 4.89 M/UL (ref 4.1–5.7)
SAMPLES BEING HELD,HOLD: NORMAL
SODIUM SERPL-SCNC: 140 MMOL/L (ref 136–145)
SODIUM SERPL-SCNC: 140 MMOL/L (ref 136–145)
STRESS ANGINA INDEX: 0
STRESS BASELINE DIAS BP: 84 MMHG
STRESS BASELINE HR: 61 BPM
STRESS BASELINE SYS BP: 124 MMHG
STRESS ESTIMATED WORKLOAD: 16.8 METS
STRESS EXERCISE DUR MIN: NORMAL
STRESS PEAK DIAS BP: 90 MMHG
STRESS PEAK SYS BP: 160 MMHG
STRESS PERCENT HR ACHIEVED: 96 %
STRESS POST PEAK HR: 169 BPM
STRESS RATE PRESSURE PRODUCT: NORMAL BPM*MMHG
STRESS TARGET HR: 176 BPM
TRIGL SERPL-MCNC: 90 MG/DL (ref ?–150)
VLDLC SERPL CALC-MCNC: 18 MG/DL
WBC # BLD AUTO: 5.4 K/UL (ref 4.1–11.1)
WBC # BLD AUTO: 5.9 K/UL (ref 4.1–11.1)

## 2021-08-03 PROCEDURE — 93306 TTE W/DOPPLER COMPLETE: CPT

## 2021-08-03 PROCEDURE — 95816 EEG AWAKE AND DROWSY: CPT | Performed by: PSYCHIATRY & NEUROLOGY

## 2021-08-03 PROCEDURE — 85613 RUSSELL VIPER VENOM DILUTED: CPT

## 2021-08-03 PROCEDURE — 93017 CV STRESS TEST TRACING ONLY: CPT

## 2021-08-03 PROCEDURE — 70496 CT ANGIOGRAPHY HEAD: CPT

## 2021-08-03 PROCEDURE — 85302 CLOT INHIBIT PROT C ANTIGEN: CPT

## 2021-08-03 PROCEDURE — 80061 LIPID PANEL: CPT

## 2021-08-03 PROCEDURE — 93271 ECG/MONITORING AND ANALYSIS: CPT

## 2021-08-03 PROCEDURE — 99232 SBSQ HOSP IP/OBS MODERATE 35: CPT | Performed by: SPECIALIST

## 2021-08-03 PROCEDURE — 85306 CLOT INHIBIT PROT S FREE: CPT

## 2021-08-03 PROCEDURE — 83036 HEMOGLOBIN GLYCOSYLATED A1C: CPT

## 2021-08-03 PROCEDURE — 85303 CLOT INHIBIT PROT C ACTIVITY: CPT

## 2021-08-03 PROCEDURE — 85240 CLOT FACTOR VIII AHG 1 STAGE: CPT

## 2021-08-03 PROCEDURE — 85027 COMPLETE CBC AUTOMATED: CPT

## 2021-08-03 PROCEDURE — 74011250637 HC RX REV CODE- 250/637: Performed by: FAMILY MEDICINE

## 2021-08-03 PROCEDURE — 86146 BETA-2 GLYCOPROTEIN ANTIBODY: CPT

## 2021-08-03 PROCEDURE — 36415 COLL VENOUS BLD VENIPUNCTURE: CPT

## 2021-08-03 PROCEDURE — 74011250636 HC RX REV CODE- 250/636: Performed by: FAMILY MEDICINE

## 2021-08-03 PROCEDURE — 81241 F5 GENE: CPT

## 2021-08-03 PROCEDURE — 99255 IP/OBS CONSLTJ NEW/EST HI 80: CPT | Performed by: PSYCHIATRY & NEUROLOGY

## 2021-08-03 PROCEDURE — 93272 ECG/REVIEW INTERPRET ONLY: CPT | Performed by: INTERNAL MEDICINE

## 2021-08-03 PROCEDURE — 93018 CV STRESS TEST I&R ONLY: CPT | Performed by: SPECIALIST

## 2021-08-03 PROCEDURE — 85300 ANTITHROMBIN III ACTIVITY: CPT

## 2021-08-03 PROCEDURE — 74011000636 HC RX REV CODE- 636: Performed by: RADIOLOGY

## 2021-08-03 PROCEDURE — 85305 CLOT INHIBIT PROT S TOTAL: CPT

## 2021-08-03 PROCEDURE — 74011250637 HC RX REV CODE- 250/637: Performed by: NURSE PRACTITIONER

## 2021-08-03 PROCEDURE — 80053 COMPREHEN METABOLIC PANEL: CPT

## 2021-08-03 PROCEDURE — 86147 CARDIOLIPIN ANTIBODY EA IG: CPT

## 2021-08-03 PROCEDURE — 85025 COMPLETE CBC W/AUTO DIFF WBC: CPT

## 2021-08-03 PROCEDURE — 81240 F2 GENE: CPT

## 2021-08-03 PROCEDURE — 93016 CV STRESS TEST SUPVJ ONLY: CPT | Performed by: SPECIALIST

## 2021-08-03 PROCEDURE — 93306 TTE W/DOPPLER COMPLETE: CPT | Performed by: SPECIALIST

## 2021-08-03 RX ORDER — GUAIFENESIN 100 MG/5ML
81 LIQUID (ML) ORAL DAILY
Qty: 100 TABLET | Refills: 0 | Status: SHIPPED | OUTPATIENT
Start: 2021-08-04 | End: 2022-03-17

## 2021-08-03 RX ORDER — ACETAMINOPHEN 325 MG/1
650 TABLET ORAL
Qty: 30 TABLET | Refills: 0 | Status: SHIPPED | OUTPATIENT
Start: 2021-08-03 | End: 2022-03-17

## 2021-08-03 RX ORDER — ATORVASTATIN CALCIUM 40 MG/1
40 TABLET, FILM COATED ORAL
Qty: 30 TABLET | Refills: 0 | Status: SHIPPED | OUTPATIENT
Start: 2021-08-03 | End: 2022-03-17

## 2021-08-03 RX ORDER — ATORVASTATIN CALCIUM 20 MG/1
40 TABLET, FILM COATED ORAL
Status: DISCONTINUED | OUTPATIENT
Start: 2021-08-03 | End: 2021-08-03 | Stop reason: HOSPADM

## 2021-08-03 RX ADMIN — PANTOPRAZOLE SODIUM 40 MG: 40 TABLET, DELAYED RELEASE ORAL at 10:54

## 2021-08-03 RX ADMIN — Medication 10 ML: at 14:22

## 2021-08-03 RX ADMIN — ACETAMINOPHEN 650 MG: 325 TABLET ORAL at 10:54

## 2021-08-03 RX ADMIN — ASPIRIN 81 MG: 81 TABLET, CHEWABLE ORAL at 10:54

## 2021-08-03 RX ADMIN — IOPAMIDOL 100 ML: 755 INJECTION, SOLUTION INTRAVENOUS at 11:00

## 2021-08-03 RX ADMIN — ENOXAPARIN SODIUM 40 MG: 40 INJECTION SUBCUTANEOUS at 10:55

## 2021-08-03 RX ADMIN — Medication 10 ML: at 05:55

## 2021-08-03 NOTE — PROGRESS NOTES
CARDIOLOGY PROGREESS NOTE    Elmer Fleming MD,  [address], [place], [address] [facility]  Phone [phone]; Fax [phone]  Mobile [phone]   Voice Mail [phone]                               2021  9:05 AM  Other, MD Smiley  :  1975   MRN:  [id number]     CC: Syncope          Admission Diagnosis: Syncope [R55]         ATTENTION:   This medical record was transcribed using an electronic medical records/speech recognition system. Although proofread, it may and can contain electronic, spelling and other errors. Corrections may be executed at a later time. Please feel free to contact us for any clarifications as needed. Impression Plan/Recommendation   1. Syncope  2. Elevated cholesterol profile     1.  stress test was done today and he had occasional PVC in recovery but none during exercise and excellent exercise tolerance  2. Echo demonstrated EF of 50 to 55% otherwise no significant valvular disease noted. Bubble study was negative  3. We will provide him a loop recorder in the hospital today and from my standpoint he can be discharged home             Jerrica Haro is a 42 y.o. male I am seeing for syncope. He said he was jogging and suddenly felt dizzy. He has no prior cardiac history he has possibly some elevated cholesterol but is being treated with diet for the time being. He is also had episode of syncope in July when he was nauseated. I believe it may be autonomic insufficiency. He is active and runs regularly. At least 3 days a week. Denies any chest discomfort except for some reflux. Cardiac risk factors: dyslipidemia, male gender. Patient states he is difficulty sleeping but no issues from a cardiac standpoint. No Known Allergies      Past Medical History:   Diagnosis Date    GERD (gastroesophageal reflux disease)         History reviewed. No pertinent surgical history.      .Home Medications:  Prior to Admission Medications   Prescriptions Last Dose Informant Patient Reported? Taking? Omeprazole delayed release (PRILOSEC D/R) 20 mg tablet 8/1/2021 at Unknown time  Yes Yes   Sig: Take 20 mg by mouth daily. Facility-Administered Medications: None       Hospital Medications:  Current Facility-Administered Medications   Medication Dose Route Frequency    sodium chloride (NS) flush 5-40 mL  5-40 mL IntraVENous Q8H    sodium chloride (NS) flush 5-40 mL  5-40 mL IntraVENous PRN    acetaminophen (TYLENOL) tablet 650 mg  650 mg Oral Q6H PRN    Or    acetaminophen (TYLENOL) suppository 650 mg  650 mg Rectal Q6H PRN    polyethylene glycol (MIRALAX) packet 17 g  17 g Oral DAILY PRN    ondansetron (ZOFRAN ODT) tablet 4 mg  4 mg Oral Q8H PRN    Or    ondansetron (ZOFRAN) injection 4 mg  4 mg IntraVENous Q6H PRN    enoxaparin (LOVENOX) injection 40 mg  40 mg SubCUTAneous DAILY    pantoprazole (PROTONIX) tablet 40 mg  40 mg Oral ACB    aspirin chewable tablet 81 mg  81 mg Oral DAILY     Current Outpatient Medications   Medication Sig    omeprazole (PRILOSEC) 40 mg capsule Take 40 mg by mouth daily. OBJECTIVE       Laboratory and Imaging have been reviewed and are notable for        Diagnostic Tests:     Recent Labs     08/02/21  0945   TROIQ <0.05     Recent Labs     08/03/21  0541 08/02/21  0945    140   K 4.0 4.5   CO2 27 28   BUN 14 20   CREA 1.06 1.28   * 114*   MG  --  2.1   WBC 5.9 6.2   HGB 14.9 15.5   HCT 41.9 43.1    156         Cardiac work up to date:  No specialty comments available. Social History:  Social History     Tobacco Use    Smoking status: Never Smoker    Smokeless tobacco: Never Used   Substance Use Topics    Alcohol use: Yes     Alcohol/week: 6.0 standard drinks     Types: 6 Cans of beer per week       Family History:  History reviewed. No pertinent family history.     Review of Symptoms:  A comprehensive review of systems was negative except for that written in the HPI. Physical Exam:      Visit Vitals  BP (!) 102/57   Pulse (!) 56   Temp 98.2 °F (36.8 °C)   Resp 16   Ht 6' 4\" (1.93 m)   Wt 180 lb (81.6 kg)   SpO2 96%   BMI 21.91 kg/m²     General Appearance:  Well developed, well nourished,alert and oriented x 3, and individual in no acute distress. Ears/Nose/Mouth/Throat:   Hearing grossly normal.Normal oral mucosa,no scleral icterus     Neck: Supple no JVD or bruits,no cervical lymphadenopathy   Chest:   Lungs clear to auscultation bilaterally,no rales rhonchi or wheezing   Cardiovascular:  Regular rate and rhythm, S1, S2 normal,  Murmur. PMI nondisplaced   Abdomen:   Soft, non-tender, bowel sounds are active. No abdominal bruits   Extremities: No edema bilaterally. Pulses detected, no varicosities   Skin: Warm and dry. No bruising  Neuro  Moves all extermities and neurologically intact                                                       I have discussed the diagnosis with the patient and the intended plan as seen in the above orders. Questions were answered concerning future plans. I have discussed medication side effects and warnings with the patient as well. Seb Rich is in agreement to the plan listed above and wishes to proceed. he  was instructed not to smoke, eat heart healthy diet  and to exercise. Thank you for the consult and the opportunity to be involved in the care of Seb Rich.         Dilia Dimas MD

## 2021-08-03 NOTE — DISCHARGE INSTRUCTIONS
Patient Discharge Instructions    Kailyn Stephens / [id number] : 1975    Admitted 2021 Discharged: 8/3/2021 4:26 PM     ACUTE DIAGNOSES:  Syncope [R55]    CHRONIC MEDICAL DIAGNOSES:  Problem List as of 8/3/2021 Never Reviewed        Codes Class Noted - Resolved    * (Principal) Acute stroke due to ischemia [facility]) ICD-10-CM: I63.9  ICD-9-CM: 434.91  8/3/2021 - Present        Syncope ICD-10-CM: R55  ICD-9-CM: 780.2  2021 - Present              DISCHARGE MEDICATIONS:         · It is important that you take the medication exactly as they are prescribed. · Keep your medication in the bottles provided by the pharmacist and keep a list of the medication names, dosages, and times to be taken in your wallet. · Do not take other medications without consulting your doctor. DIET:  Cardiac Diet  ACTIVITY: Activity as tolerated    ADDITIONAL INFORMATION: If you experience any of the following symptoms then please call your primary care physician or return to the emergency room if you cannot get hold of your doctor: Fever, chills, nausea, vomiting, diarrhea, change in mentation, falling, bleeding, shortness of breath. FOLLOW UP CARE:  Dr. Jett Share:  you are to call and set up an appointment to see them with in 1 week. Follow-up with specialists at directed by them      Information obtained by :  I understand that if any problems occur once I am at home I am to contact my physician. I understand and acknowledge receipt of the instructions indicated above.                                                                                                                                            Physician's or R.N.'s Signature                                                                  Date/Time                                                                                                                                              Patient or Representative Signature Date/Time      [facility] Transient Ischemic Attack and/or Cerebral Vascular Accident Policy   It is the policy of the Department of Motor Vehicles, based on guidance and recommendations from the [address], that if a  suffers a Transient Ischemic Attack (TIA), the s privilege to operate a motor vehicle will be suspended for three months. The three-month suspension may be shortened for drivers who have suffered a TIA, provided that treatment has been provided mitigating the risk of reoccurrence and there is no impact on a s ability to operate a motor vehicle safely. These cases may be referred to the [address] for their guidance and recommendations. If a  suffers a Cerebral Vascular Accident (CVA), the s privilege to operate a motor vehicle will be suspended for six months. This six-month suspension period may be shortened if V receives information from the 's health care provider indicating that the  has fully recovered. These cases may be referred to the [address] for its guidance and recommendations. Following the six-month suspension, [facility] will request an evaluation with a certified  rehabilitation specialist if the  has suffered paralysis or cognitive changes due to the CVA. If the s physical and cognitive information is not indicated in the medical report received by the agency, [facility] will request it from the health care provider.    ([email or web address]). Based on the information received about the s cognitive abilities, the  may also be subject to the [facility]. The  is also required to furnish an updated Vision Report with an examination date that is not older than 90 days and occurs after the TIA/CVA, in accordance with [place] Code [place]311.      [facility] may impose additional requirements on the individual depending on the information received by the agency. [email or web address].[email or web address] asp

## 2021-08-03 NOTE — PROGRESS NOTES
1132 -     TRANSFER - IN REPORT:    Verbal report received from DIANE GUZMAN(name) on [place]  being received from ED 7(unit) for routine progression of care      Report consisted of patients Situation, Background, Assessment and   Recommendations(SBAR). Information from the following report(s) SBAR, Kardex, ED Summary, Intake/Output, MAR, Accordion and Recent Results was reviewed with the receiving nurse. Opportunity for questions and clarification was provided. Assessment completed upon patients arrival to unit and care assumed.

## 2021-08-03 NOTE — ED NOTES
TRANSFER - OUT REPORT:    Verbal report given to Vonnie RN(name) on Maranda Giraldo  being transferred to [facility](unit) for routine progression of care       Report consisted of patients Situation, Background, Assessment and   Recommendations(SBAR). Information from the following report(s) SBAR, Kardex, ED Summary, [facility] and Recent Results was reviewed with the receiving nurse. Lines:   Peripheral IV 08/02/21 Right Antecubital (Active)   Site Assessment Clean, dry, & intact 08/02/21 1518   Phlebitis Assessment 0 08/02/21 1518   Infiltration Assessment 0 08/02/21 1518   Dressing Status Clean, dry, & intact 08/02/21 1518   Dressing Type Transparent 08/02/21 1518   Hub Color/Line Status Pink 08/02/21 1518        Opportunity for questions and clarification was provided.       Patient transported with:   Monitor  Registered Nurse

## 2021-08-03 NOTE — DISCHARGE SUMMARY
Physician Discharge Summary     Patient ID:    Denise Zepeda  [id number]  66 y.o.  1975  Alexis Kendall MD    Admit date: 8/2/2021  Discharge date and time: 8/3/2021  Admission Diagnoses: Syncope [R55]; Left Frontal CVA  Discharge Medications:   Current Discharge Medication List      START taking these medications    Details   acetaminophen (TYLENOL) 325 mg tablet Take 2 Tablets by mouth every six (6) hours as needed for Pain or Fever. Qty: 30 Tablet, Refills: 0      aspirin 81 mg chewable tablet Take 1 Tablet by mouth daily. OTC  Qty: 100 Tablet, Refills: 0      atorvastatin (LIPITOR) 40 mg tablet Take 1 Tablet by mouth nightly. Qty: 30 Tablet, Refills: 0         CONTINUE these medications which have NOT CHANGED    Details   omeprazole (PRILOSEC) 40 mg capsule Take 40 mg by mouth daily. Follow up Care:    1. Alexis Kendall MD with in 1 weeks  2. specialists as directed. Diet:  Cardiac Diet  Disposition:  Home. Advanced Directive:  Discharge Exam:  [See today's progress note.]  CONSULTATIONS: Cardiology and Neurology    Significant Diagnostic Studies:   Recent Labs     08/03/21  0809 08/03/21  0541   WBC 5.4 5.9   HGB 15.4 14.9   HCT 43.4 41.9    162     Recent Labs     08/03/21  0809 08/03/21  0541 08/02/21  0945    140 140   K 4.0 4.0 4.5   * 109* 104   CO2 27 27 28   BUN 14 14 20   CREA 1.10 1.06 1.28   * 108* 114*   CA 8.4* 9.0 8.9   MG  --   --  2.1     Recent Labs     08/03/21  0809   ALT 93*   AP 70   TBILI 0.9   TP 6.8   ALB 4.0   GLOB 2.8     Lab Results   Component Value Date/Time    Glucose (POC) 116 (H) 11/05/2009 04:54 PM     HOSPITAL COURSE & TREATMENT RENDERED:   1.  See today's progress note:  Daily Progress Note and Discharge Note: 8/3/2021  Denise Zepeda  PCP  Dr Alexis Kendlal      Assessment/Plan:   Syncope due to CVA  -Echo ok, Stress test ok  -EKG shows first-degree AV block and incomplete right bundle branch  -Consulted cardiology and Neurology  -MRI with small left frontal CVA  - EEG negative  - follow up with Cardiology, Neurology and PCP as directed.       GERD  -Continue PPI      Problem List:            Problem List as of 8/3/2021 Never Reviewed         Codes Class Noted - Resolved     * (Principal) Acute stroke due to ischemia [facility]) ICD-10-CM: I63.9  ICD-9-CM: 434.91   8/3/2021 - Present           Syncope ICD-10-CM: R55  ICD-9-CM: 233. 2   8/2/2021 - Present      HPI:   Jones Brar a 42 y. o. male who presented with a syncopal episode this morning.  It occurred while he was jogging and he began to feel dizzy and then woke up on the ground. He isn't sure how long he was out for, but estimates probably minutes. His neighbor went out to check on him and when he came to his wife was also checking on him. No loss of bowel or bladder, no tongue biting. He denies any nausea or vomiting.  Similar event occurred about 2 weeks ago while he was sitting on the toilet and did not seek medical attention at that time. Carmelita Lopez has a significant family history of heart disease. He reports some pain/numbness on the left side of his face, but no other complaints.  (Dr Mahamed Orellana)     8/3:  Symptoms have resolved. Getting ECHO now. MRI with possible small CVA. Neuro and Cardiology to see today. AM labs pending. 415PM:  Neuro and cards have cleared for discharge and pt wants to go home now. Stress test excellent per Cards note. ECHO normal.  Still having some left facial numbness off and on but unlikely related to CVA on the same side as the CVA. ASA and Statin added.   Follow up with Dr Fatmata Gan later this wk or early next wk and with Cardiology and Neurology as directed.       Review of Systems:   A comprehensive review of systems was negative except for that written in the HPI.     Objective:   Physical Exam:   Visit Vitals  /73 (BP 1 Location: Left upper arm, BP Patient Position: At rest)   Pulse 62   Temp 98.1 °F (36.7 °C)   Resp 18   Ht 6' 4\" (1.93 m)   Wt 81.9 kg (180 lb 8 oz)   SpO2 98%   BMI 21.97 kg/m²      O2 Device: None (Room air)  Temp (24hrs), Av.3 °F (36.8 °C), Min:98.1 °F (36.7 °C), Max:98.7 °F (37.1 °C)    No intake/output data recorded. No intake/output data recorded. General:  Alert, cooperative, no distress, appears stated age. Head:  Normocephalic, without obvious abnormality, atraumatic. Eyes:  Conjunctivae/corneas clear. PERRL, EOMs intact. Throat: Lips, mucosa, and tongue normal. Teeth and gums normal.   Neck: Supple, symmetrical, trachea midline, no adenopathy, thyroid: no enlargement/tenderness/nodules, no carotid bruit and no JVD. Lungs:   Clear to auscultation bilaterally. Chest wall:  No tenderness or deformity. Heart:  Regular rate and rhythm, S1, S2 normal, no murmur, click, rub or gallop. Abdomen:   Soft, non-tender. Bowel sounds normal. No masses,  No organomegaly. Extremities: Extremities normal, atraumatic, no cyanosis or edema. No calf tenderness or cords. Pulses: 2+ and symmetric all extremities. Skin: Skin color, texture, turgor normal. No rashes or lesions   Neurologic: CNII-XII intact. Alert and oriented X 3. Fine motor of hands and fingers normal.   equal.  No cogwheeling or rigidity. Gait not tested at this time. Sensation grossly normal to touch. Gross motor of extremities normal.        Data Review:   MRI Brain 21  FINDINGS:  The ventricles are normal in size and position. Small area of restricted  diffusion in the white matter of the left frontal lobe anteriorly There is no  cerebellar tonsillar herniation. Expected arterial flow-voids are present. Air-fluid level in the left maxillary sinus The orbital contents are within  normal limits. No significant osseous or scalp lesions are identified. IMPRESSION  Tiny focus of restricted diffusion in the anterior left frontal lobe white  matter.  May represent tiny infarct     EXAM: CTA HEAD NECK  21  no other soft tissue CONT  Clinical history: CVA  INDICATION: CVA  COMPARISON: 8/2/2021. CONTRAST: 100 mL of Isovue-370. FINDINGS:  CTA NECK  There is conventional three vessel arch anatomy. The bilateral subclavian,  common carotid, and internal carotid arteries are patent with no flow-limiting  stenosis. % of right carotid artery stenosis: 0  % of left carotid artery stenosis: 0  NASCET method was utilized for calculating stenosis. The left vertebral artery is dominant. Right vertebral artery is diminutive in  size. .  The cervical soft tissues are unremarkable. .  CTA HEAD  Left vertebral artery is dominant. Right vertebral artery terminates in PICA. [place] Fly The basilar artery and its branches are normal. The internal carotid, anterior  cerebral, and middle cerebral arteries are patent. There is no flow-limiting  intracranial stenosis. There is no aneurysm. There are very small bilateral  posterior communicating arteries. Left maxillary sinus disease. IMPRESSION  No acute intracranial process. There is no major vessel occlusion. There is no aneurysm, dissection or  hemodynamically significant stenosis. .     Stress Test Findings 8/3/21  ECG Baseline ECG: Normal sinus rhythm, interventricular conduction delay. Arrhythmias during stress: rare PACs. Arrhythmias during recovery: rare unifocal PVCs. Arrhythmias were not significant.    ECG is diagnostic. Stress Findings A stress test was performed following the Ronal protocol, with the patient reaching stage 5. The test was stopped because the patient complained of fatigue. The patient reported no angina during stress. Denies   The patient's response to exercise was adequate for diagnosis. Blood pressure demonstrated a normal response to exercise. Heart rate demonstrated maximal response to stress. Overall, the patient's exercise capacity was excellent. Angina Index is 0.   Negative stress test.      ECHO 8/3821  Interpretation Summary  Result status: Final result   · Saline contrast was given to evaluate for intracardiac shunt. · LV: Estimated LVEF is 50 - 55%. Normal cavity size and wall thickness. Low normal systolic function. · IAS: Agitated saline contrast study was performed.  There was no shunting at baseline, with provocation or with Valsalva.               Lab Results   Component Value Date/Time     Cholesterol, total 163 08/03/2021 05:41 AM     HDL Cholesterol 48 08/03/2021 05:41 AM     LDL, calculated 97 08/03/2021 05:41 AM     VLDL, calculated 18 08/03/2021 05:41 AM     Triglyceride 90 08/03/2021 05:41 AM     CHOL/HDL Ratio 3.4 08/03/2021 05:41 AM      Signed:  Tuyet Aguirre MD  8/3/2021  4:28 PM

## 2021-08-03 NOTE — PROCEDURES
Farshad Santos [facility] [place] [address]  EEG    Name:  Bibi Souza  MR#:  [id number]  :  1975  ACCOUNT #:  [de-identified]  DATE OF SERVICE:  2021      REFERRING PROVIDER:  Dr. Marco Doan. CLINICAL HISTORY:  An EEG is requested on this 44-year-old man to evaluate for epileptiform abnormalities. MEDICATIONS:  Prilosec. EEG REPORT:  This tracing is obtained during the awake state. During wakefulness, there are intermittent runs of posteriorly dominant and symmetrical low-to-medium amplitude 9 cycle per second activities which attenuate with eye opening. Lower voltage faster frequency activities are seen symmetrically over the anterior head regions. Hyperventilation is not performed. Intermittent photic stimulation induces symmetric posterior driving responses. Sleep is not attained.     IMPRESSION/INTERPRETATION:  This EEG recorded during the awake state is normal.      Enedina Rehman MD      [id number]  D:  2021 12:49  T:  2021 15:18  JOB #:  [id number]

## 2021-08-03 NOTE — PROGRESS NOTES
Daily Progress Note and Discharge Note: 8/3/2021  Tatiana Olivera  PCP  Dr Yashira Garcia      Assessment/Plan:   Syncope due to CVA  -Echo ok, Stress test ok  -EKG shows first-degree AV block and incomplete right bundle branch  -Consulted cardiology and Neurology  -MRI with small left frontal CVA  - EEG negative  - follow up with Cardiology, Neurology and PCP as directed.       GERD  -Continue PPI     Problem List:  Problem List as of 8/3/2021 Never Reviewed        Codes Class Noted - Resolved    * (Principal) Acute stroke due to ischemia [facility]) ICD-10-CM: I63.9  ICD-9-CM: 434.91  8/3/2021 - Present        Syncope ICD-10-CM: R55  ICD-9-CM: 780.2  8/2/2021 - Present            HPI:   Tatiana Olivera is a 42 y.o. male who presented with a syncopal episode this morning. It occurred while he was jogging and he began to feel dizzy and then woke up on the ground. He isn't sure how long he was out for, but estimates probably minutes. His neighbor went out to check on him and when he came to his wife was also checking on him. No loss of bowel or bladder, no tongue biting. He denies any nausea or vomiting. Similar event occurred about 2 weeks ago while he was sitting on the toilet and did not seek medical attention at that time. He has a significant family history of heart disease. He reports some pain/numbness on the left side of his face, but no other complaints. (Dr Barnard Mario Alberto)    8/3:  Symptoms have resolved. Getting ECHO now. MRI with possible small CVA. Neuro and Cardiology to see today. AM labs pending. 415PM:  Neuro and cards have cleared for discharge and pt wants to go home now. Stress test excellent per Cards note. ECHO normal.  Still having some left facial numbness off and on but unlikely related to CVA on the same side as the CVA. ASA and Statin added. Follow up with Dr Mac Aceveod later this wk or early next wk and with Cardiology and Neurology as directed.       Review of Systems:   A comprehensive review of systems was negative except for that written in the HPI. Objective:   Physical Exam:   Visit Vitals  /73 (BP 1 Location: Left upper arm, BP Patient Position: At rest)   Pulse 62   Temp 98.1 °F (36.7 °C)   Resp 18   Ht 6' 4\" (1.93 m)   Wt 81.9 kg (180 lb 8 oz)   SpO2 98%   BMI 21.97 kg/m²      O2 Device: None (Room air)  Temp (24hrs), Av.3 °F (36.8 °C), Min:98.1 °F (36.7 °C), Max:98.7 °F (37.1 °C)    No intake/output data recorded. No intake/output data recorded. General:  Alert, cooperative, no distress, appears stated age. Head:  Normocephalic, without obvious abnormality, atraumatic. Eyes:  Conjunctivae/corneas clear. PERRL, EOMs intact. Throat: Lips, mucosa, and tongue normal. Teeth and gums normal.   Neck: Supple, symmetrical, trachea midline, no adenopathy, thyroid: no enlargement/tenderness/nodules, no carotid bruit and no JVD. Lungs:   Clear to auscultation bilaterally. Chest wall:  No tenderness or deformity. Heart:  Regular rate and rhythm, S1, S2 normal, no murmur, click, rub or gallop. Abdomen:   Soft, non-tender. Bowel sounds normal. No masses,  No organomegaly. Extremities: Extremities normal, atraumatic, no cyanosis or edema. No calf tenderness or cords. Pulses: 2+ and symmetric all extremities. Skin: Skin color, texture, turgor normal. No rashes or lesions   Neurologic: CNII-XII intact. Alert and oriented X 3. Fine motor of hands and fingers normal.   equal.  No cogwheeling or rigidity. Gait not tested at this time. Sensation grossly normal to touch. Gross motor of extremities normal.       Data Review:   MRI Brain 21  FINDINGS:  The ventricles are normal in size and position. Small area of restricted  diffusion in the white matter of the left frontal lobe anteriorly There is no  cerebellar tonsillar herniation. Expected arterial flow-voids are present.   Air-fluid level in the left maxillary sinus The orbital contents are within  normal limits. No significant osseous or scalp lesions are identified. IMPRESSION  Tiny focus of restricted diffusion in the anterior left frontal lobe white  matter. May represent tiny infarct    EXAM: CTA HEAD NECK  8/2/21  no other soft tissue CONT  Clinical history: CVA  INDICATION: CVA  COMPARISON: 8/2/2021. CONTRAST: 100 mL of Isovue-370. FINDINGS:  CTA NECK  There is conventional three vessel arch anatomy. The bilateral subclavian,  common carotid, and internal carotid arteries are patent with no flow-limiting  stenosis. % of right carotid artery stenosis: 0  % of left carotid artery stenosis: 0  NASCET method was utilized for calculating stenosis. The left vertebral artery is dominant. Right vertebral artery is diminutive in  size. .  The cervical soft tissues are unremarkable. .  CTA HEAD  Left vertebral artery is dominant. Right vertebral artery terminates in PICA. [place] Records The basilar artery and its branches are normal. The internal carotid, anterior  cerebral, and middle cerebral arteries are patent. There is no flow-limiting  intracranial stenosis. There is no aneurysm. There are very small bilateral  posterior communicating arteries. Left maxillary sinus disease. IMPRESSION  No acute intracranial process. There is no major vessel occlusion. There is no aneurysm, dissection or  hemodynamically significant stenosis. .    Stress Test Findings 8/3/21  ECG Baseline ECG: Normal sinus rhythm, interventricular conduction delay. Arrhythmias during stress: rare PACs. Arrhythmias during recovery: rare unifocal PVCs. Arrhythmias were not significant. ECG is diagnostic. Stress Findings A stress test was performed following the Ronal protocol, with the patient reaching stage 5. The test was stopped because the patient complained of fatigue. The patient reported no angina during stress. Denies   The patient's response to exercise was adequate for diagnosis.  Blood pressure demonstrated a normal response to exercise. Heart rate demonstrated maximal response to stress. Overall, the patient's exercise capacity was excellent. Angina Index is 0. Negative stress test.     ECHO 8/3821  Interpretation Summary  Result status: Final result   · Saline contrast was given to evaluate for intracardiac shunt. · LV: Estimated LVEF is 50 - 55%. Normal cavity size and wall thickness. Low normal systolic function. · IAS: Agitated saline contrast study was performed. There was no shunting at baseline, with provocation or with Valsalva. Lab Results   Component Value Date/Time    Cholesterol, total 163 08/03/2021 05:41 AM    HDL Cholesterol 48 08/03/2021 05:41 AM    LDL, calculated 97 08/03/2021 05:41 AM    VLDL, calculated 18 08/03/2021 05:41 AM    Triglyceride 90 08/03/2021 05:41 AM    CHOL/HDL Ratio 3.4 08/03/2021 05:41 AM     Recent Days:  Recent Labs     08/03/21  0809 08/03/21  0541 08/02/21  0945   WBC 5.4 5.9 6.2   HGB 15.4 14.9 15.5   HCT 43.4 41.9 43.1    162 156     Recent Labs     08/03/21  0809 08/03/21  0541 08/02/21  0945    140 140   K 4.0 4.0 4.5   * 109* 104   CO2 27 27 28   * 108* 114*   BUN 14 14 20   CREA 1.10 1.06 1.28   CA 8.4* 9.0 8.9   MG  --   --  2.1   ALB 4.0  --   --    TBILI 0.9  --   --    ALT 93*  --   --      No results for input(s): PH, PCO2, PO2, HCO3, FIO2 in the last 72 hours.     24 Hour Results:  Recent Results (from the past 24 hour(s))   METABOLIC PANEL, BASIC    Collection Time: 08/03/21  5:41 AM   Result Value Ref Range    Sodium 140 136 - 145 mmol/L    Potassium 4.0 3.5 - 5.1 mmol/L    Chloride 109 (H) 97 - 108 mmol/L    CO2 27 21 - 32 mmol/L    Anion gap 4 (L) 5 - 15 mmol/L    Glucose 108 (H) 65 - 100 mg/dL    BUN 14 6 - 20 MG/DL    Creatinine 1.06 0.70 - 1.30 MG/DL    BUN/Creatinine ratio 13 12 - 20      GFR est AA >60 >60 ml/min/1.73m2    GFR est non-AA >60 >60 ml/min/1.73m2    Calcium 9.0 8.5 - 10.1 MG/DL   CBC W/O DIFF    Collection Time: 08/03/21  5:41 AM   Result Value Ref Range    WBC 5.9 4.1 - 11.1 K/uL    RBC 4.70 4. 10 - 5.70 M/uL    HGB 14.9 12.1 - 17.0 g/dL    HCT 41.9 36.6 - 50.3 %    MCV 89.1 80.0 - 99.0 FL    MCH 31.7 26.0 - 34.0 PG    MCHC 35.6 30.0 - 36.5 g/dL    RDW 12.0 11.5 - 14.5 %    PLATELET 157 285 - 210 K/uL    MPV 8.9 8.9 - 12.9 FL    NRBC 0.0 0  WBC    ABSOLUTE NRBC 0.00 0.00 - 0.01 K/uL   LIPID PANEL    Collection Time: 08/03/21  5:41 AM   Result Value Ref Range    Cholesterol, total 163 <200 MG/DL    Triglyceride 90 <150 MG/DL    HDL Cholesterol 48 MG/DL    LDL, calculated 97 0 - 100 MG/DL    VLDL, calculated 18 MG/DL    CHOL/HDL Ratio 3.4 0.0 - 5.0     HEMOGLOBIN A1C WITH EAG    Collection Time: 08/03/21  5:41 AM   Result Value Ref Range    Hemoglobin A1c 4.9 4.0 - 5.6 %    Est. average glucose 94 mg/dL   ECHO ADULT COMPLETE    Collection Time: 08/03/21  8:03 AM   Result Value Ref Range    IVSd 0.81 0.60 - 1.00 cm    LVIDd 4.54 4.20 - 5.90 cm    LVIDs 3.20 cm    LVOT d 2.42 cm    LVPWd 0.75 0.60 - 1.00 cm    LVOT Peak Gradient 3.04 mmHg    LVOT Peak Velocity 87.19 cm/s    RVIDd 2.99 cm    Left Atrium Major Axis 2.56 cm    LA Volume 32.73 18.0 - 58.0 mL    LA Area 4C 12.68 cm2    LA Vol 2C 28.20 18.00 - 58.00 mL    LA Vol 4C 25.65 18.00 - 58.00 mL    LA Volume DISK BP 28.00 18.0 - 58.0 mL    Aortic Valve Area by Continuity of Peak Velocity 4.06 cm2    AoV PG 3.92 mmHg    Aortic Valve Systolic Peak Velocity 44.29 cm/s    MV A Franklin 53.52 centimeter/second    Mitral Valve E Wave Deceleration Time 281.08 ms    MV E [place] 72.63 centimeter/second    LV E' Lateral Velocity 14.64 centimeter/second    LV E' Septal Velocity 13.92 centimeter/second    Mitral Valve Pressure Half-time 81.51 ms    MVA (PHT) 2.70 cm2    Pulmonic Valve Systolic Peak Instantaneous Gradient 2.93 mmHg    Pulmonic Valve Max Velocity 85.53 cm/s    Tapse 2.23 (A) 1.50 - 2.00 cm    Triscuspid Valve Regurgitation Peak Gradient 20.64 mmHg    TR Max Velocity 227.15 cm/s    AO ASC D 3.34 cm    Ao Root D 3.60 cm    IVC proximal 1.98 cm    LV Mass .6 88.0 - 224.0 g    LV Mass AL Index 52.6 49.0 - 115.0 g/m2    Left Atrium Minor Axis 1.21 cm    LA Vol Index 15.44 16.00 - 28.00 ml/m2    LA Vol Index 13.30 16.00 - 28.00 ml/m2    LA Vol Index 12.10 16.00 - 28.00 ml/m2    ODETTE/BSA Pk Franklin 1.9 cm2/m2   CBC WITH AUTOMATED DIFF    Collection Time: 08/03/21  8:09 AM   Result Value Ref Range    WBC 5.4 4.1 - 11.1 K/uL    RBC 4.89 4. 10 - 5.70 M/uL    HGB 15.4 12.1 - 17.0 g/dL    HCT 43.4 36.6 - 50.3 %    MCV 88.8 80.0 - 99.0 FL    MCH 31.5 26.0 - 34.0 PG    MCHC 35.5 30.0 - 36.5 g/dL    RDW 11.9 11.5 - 14.5 %    PLATELET 695 379 - 323 K/uL    MPV 8.6 (L) 8.9 - 12.9 FL    NRBC 0.0 0  WBC    ABSOLUTE NRBC 0.00 0.00 - 0.01 K/uL    NEUTROPHILS 73 32 - 75 %    LYMPHOCYTES 20 12 - 49 %    MONOCYTES 6 5 - 13 %    EOSINOPHILS 1 0 - 7 %    BASOPHILS 0 0 - 1 %    IMMATURE GRANULOCYTES 0 0.0 - 0.5 %    ABS. NEUTROPHILS 3.9 1.8 - 8.0 K/UL    ABS. LYMPHOCYTES 1.1 0.8 - 3.5 K/UL    ABS. MONOCYTES 0.3 0.0 - 1.0 K/UL    ABS. EOSINOPHILS 0.1 0.0 - 0.4 K/UL    ABS. BASOPHILS 0.0 0.0 - 0.1 K/UL    ABS. IMM. GRANS. 0.0 0.00 - 0.04 K/UL    DF AUTOMATED     METABOLIC PANEL, COMPREHENSIVE    Collection Time: 08/03/21  8:09 AM   Result Value Ref Range    Sodium 140 136 - 145 mmol/L    Potassium 4.0 3.5 - 5.1 mmol/L    Chloride 109 (H) 97 - 108 mmol/L    CO2 27 21 - 32 mmol/L    Anion gap 4 (L) 5 - 15 mmol/L    Glucose 107 (H) 65 - 100 mg/dL    BUN 14 6 - 20 MG/DL    Creatinine 1.10 0.70 - 1.30 MG/DL    BUN/Creatinine ratio 13 12 - 20      GFR est AA >60 >60 ml/min/1.73m2    GFR est non-AA >60 >60 ml/min/1.73m2    Calcium 8.4 (L) 8.5 - 10.1 MG/DL    Bilirubin, total 0.9 0.2 - 1.0 MG/DL    ALT (SGPT) 93 (H) 12 - 78 U/L    AST (SGOT) 107 (H) 15 - 37 U/L    Alk.  phosphatase 70 45 - 117 U/L    Protein, total 6.8 6.4 - 8.2 g/dL    Albumin 4.0 3.5 - 5.0 g/dL    Globulin 2.8 2.0 - 4.0 g/dL    A-G Ratio 1.4 1.1 - 2.2 EXERCISE CARDIAC STRESS TEST    Collection Time: 08/03/21  9:19 AM   Result Value Ref Range    Target  bpm    Exercise duration time 00:12:55     Stress Base Systolic  mmHg    Stress Base Diastolic BP 90 mmHg    Post peak  BPM    Baseline HR 61 BPM    Estimated workload 16.8 METS    Baseline  mmHg    Percent HR 96 %    Angina Index 0     Stress Base Diastolic BP 84 mmHg    Stress Rate Pressure Product 27,040 BPM*mmHg   SAMPLES BEING HELD    Collection Time: 08/03/21 12:57 PM   Result Value Ref Range    SAMPLES BEING HELD 1LAV     COMMENT        Add-on orders for these samples will be processed based on acceptable specimen integrity and analyte stability, which may vary by analyte. Medications reviewed  Current Facility-Administered Medications   Medication Dose Route Frequency    atorvastatin (LIPITOR) tablet 40 mg  40 mg Oral QHS    iopamidoL (ISOVUE-370) 76 % injection 100 mL  100 mL IntraVENous RAD ONCE    sodium chloride (NS) flush 5-40 mL  5-40 mL IntraVENous Q8H    sodium chloride (NS) flush 5-40 mL  5-40 mL IntraVENous PRN    acetaminophen (TYLENOL) tablet 650 mg  650 mg Oral Q6H PRN    Or    acetaminophen (TYLENOL) suppository 650 mg  650 mg Rectal Q6H PRN    polyethylene glycol (MIRALAX) packet 17 g  17 g Oral DAILY PRN    ondansetron (ZOFRAN ODT) tablet 4 mg  4 mg Oral Q8H PRN    Or    ondansetron (ZOFRAN) injection 4 mg  4 mg IntraVENous Q6H PRN    enoxaparin (LOVENOX) injection 40 mg  40 mg SubCUTAneous DAILY    pantoprazole (PROTONIX) tablet 40 mg  40 mg Oral ACB    aspirin chewable tablet 81 mg  81 mg Oral DAILY     Care Plan discussed with: Patient, Cardiology and Neurology  Total time spent with patient and review of records: 30 minutes.   Arlin Jauregui MD

## 2021-08-04 LAB
AT III PPP CHRO-ACNC: 105 % (ref 75–135)
INTERPRETATION, 117893: NORMAL
PROT C PPP-ACNC: 109 % (ref 73–180)
PROT S ACT/NOR PPP: 99 % (ref 63–140)
PROT S AG ACT/NOR PPP IA: 66 % (ref 60–150)
PROT S FREE AG ACT/NOR PPP IA: 103 % (ref 57–157)
SCREEN APTT: 45.5 SEC (ref 0–51.9)
SCREEN DRVVT: 35.8 SEC (ref 0–47)

## 2021-08-04 NOTE — ADT AUTH CERT NOTES
ADDITIONAL INFO FOR [facility] -     Tierra Feliciano 1975  INPATIENT ER ADMIT 08/02/2021  ICD-10: I63.9  Farshad Rangel [address] 81 - NPI: [id number]  [facility], [address] [facility]    UR PHONE # [phone]  UR FAX # [phone]    [facility]  NPI: [id number]  # [phone] - F# [phone]  [address] [facility], [address] [facility]

## 2021-08-04 NOTE — PROGRESS NOTES
Physician Progress Note      James Goldberg  CSN #:                  [id number]  :                       1975  ADMIT DATE:       2021 9:05 AM  [address] DATE:        8/3/2021 6:13 PM  RESPONDING  PROVIDER #:        Sadie Gay MD          QUERY TEXT:    Patient admitted with ***. noted to have ***. If possible, please document in progress notes and discharge summary if you are evaluating and/or treating any of the following: The medical record reflects the following:  Risk Factors: Elelvated Cholesterol , Drinks approx. 6 cans of beer per week,  Clinical Indicators: Presented to Er after syncope episode. Elevated cholesterol profile, EKG noted with 1st degree AV block and incomplete right bundle branch, and MRI also noted to have Tiny focus of restricted diffusion in the anterior left frontal lobe white matter. May represent tiny infarct. Stress test Occasional PVC, but non during exercise, Echo wit hEF 50-55% . Bubble study negative. Treatment: Telemetry,MRI, EEG, Neuro consulted , Echo, ASA, Labs,    Thank you,  Stephen Bojorquez [facility], [address] [facility], [address] [place] [address], [address] [place] [address]  Options provided:  -- Syncope due to Stroke  -- Syncope due to arrhythmia , first degree AV block and incomplete right bbb  -- Syncope due to both Small stroke and arrhythmia , first degree AV block and incomplete right bbb  -- Other - I will add my own diagnosis  -- Disagree - Not applicable / Not valid  -- Disagree - Clinically unable to determine / Unknown  -- Refer to Clinical Documentation Reviewer    PROVIDER RESPONSE TEXT:    The syncope is due to stroke.     Query created by: Margie Pierre on 8/3/2021 10:43 AM      Electronically signed by:  Brian Arellano MD 2021 8:01 AM

## 2021-08-05 LAB — PROT C AG PPP IA-ACNC: 90 % (ref 60–150)

## 2021-08-06 LAB
B2 GLYCOPROT1 IGA SER-ACNC: <9 GPI IGA UNITS (ref 0–25)
B2 GLYCOPROT1 IGG SER-ACNC: <9 GPI IGG UNITS (ref 0–20)
B2 GLYCOPROT1 IGM SER-ACNC: <9 GPI IGM UNITS (ref 0–32)
CARDIOLIPIN IGA SER IA-ACNC: <9 APL U/ML (ref 0–11)
CARDIOLIPIN IGG SER IA-ACNC: 11 GPL U/ML (ref 0–14)
CARDIOLIPIN IGM SER IA-ACNC: <9 MPL U/ML (ref 0–12)
COMMENT, 511217: NORMAL
F5 GENE MUT ANL BLD/T: NORMAL

## 2021-08-10 LAB
ADDITIONAL INFORMATION 480297: NORMAL
F2 GENE MUT ANL BLD/T: NORMAL

## 2021-09-01 ENCOUNTER — OFFICE VISIT (OUTPATIENT)
Dept: NEUROLOGY | Age: 46
End: 2021-09-01
Payer: COMMERCIAL

## 2021-09-01 VITALS
SYSTOLIC BLOOD PRESSURE: 112 MMHG | HEIGHT: 77 IN | WEIGHT: 182.2 LBS | OXYGEN SATURATION: 96 % | BODY MASS INDEX: 21.51 KG/M2 | HEART RATE: 80 BPM | DIASTOLIC BLOOD PRESSURE: 74 MMHG

## 2021-09-01 DIAGNOSIS — R55 SYNCOPE, UNSPECIFIED SYNCOPE TYPE: Primary | ICD-10-CM

## 2021-09-01 PROCEDURE — 99214 OFFICE O/P EST MOD 30 MIN: CPT | Performed by: NURSE PRACTITIONER

## 2021-09-01 NOTE — LETTER
9/1/2021 9:52 AM    Mr. Sharmin Chris [facility] [id number]       Mirta Nicole is a patient At the [facility]. He may drive an automobile.               Sincerely,          Aubrie Edwards NP

## 2021-09-01 NOTE — PROGRESS NOTES
Sarkis Rosario is a 42 y.o. male who presents with the following  Chief Complaint   Patient presents with   [facility] Follow Up     syncope. [facility] ER       HPI     FU for ER visit, syncope. Saw [facility] and discussed MRI, EEG. Old potential stroke, incidental finding. Not cause. Had normal EEG   States he will get some dizziness when moving around and getting up from a seated or lying position. He states he was running and felt a head rush and fell. Woke up on the ground. Left side of face still numb from fall. Left side and stops at midline. Comes and goes. Some pain around left eye. Extensive workup all negative thus far. On Aspirin, Lipitor now. No other episodes. Was on mile 2 out of 2.5 when this happened. Not drinking enough water per report. Did not eat before running. No family hx epilepsy. No jerking, twitching, convulsion   No wet pants or bite tongue. Family hx of heart though       No Known Allergies    Current Outpatient Medications   Medication Sig    acetaminophen (TYLENOL) 325 mg tablet Take 2 Tablets by mouth every six (6) hours as needed for Pain or Fever.  aspirin 81 mg chewable tablet Take 1 Tablet by mouth daily. OTC    atorvastatin (LIPITOR) 40 mg tablet Take 1 Tablet by mouth nightly.  omeprazole (PRILOSEC) 40 mg capsule Take 40 mg by mouth daily. No current facility-administered medications for this visit. Social History     Tobacco Use   Smoking Status Never Smoker   Smokeless Tobacco Never Used       Past Medical History:   Diagnosis Date    GERD (gastroesophageal reflux disease)        No past surgical history on file. No family history on file.     Social History     Socioeconomic History    Marital status:      Spouse name: Not on file    Number of children: Not on file    Years of education: Not on file    Highest education level: Not on file   Tobacco Use    Smoking status: Never Smoker    Smokeless tobacco: Never Used Substance and Sexual Activity    Alcohol use: Yes     Alcohol/week: 6.0 standard drinks     Types: 6 Cans of beer per week    Drug use: Never     Social Determinants of Health     Financial Resource Strain:     Difficulty of Paying Living Expenses:    Food Insecurity:     Worried About Running Out of Food in the Last Year:     [address] in the Last Year:    Transportation Needs:     Lack of Transportation (Medical):  Lack of Transportation (Non-Medical):    Physical Activity:     Days of Exercise per Week:     Minutes of Exercise per Session:    Stress:     Feeling of Stress :    Social Connections:     Frequency of Communication with Friends and Family:     Frequency of Social Gatherings with Friends and Family:     Attends [employer] Services:     Active Member of Clubs or Organizations:     Attends Club or Organization Meetings:     Marital Status:        Review of Systems   Eyes: Negative for blurred vision, double vision and photophobia. Cardiovascular: Negative for chest pain and palpitations. Gastrointestinal: Negative for nausea and vomiting. Musculoskeletal: Negative for falls and joint pain. Neurological: Negative for dizziness, tingling, seizures, loss of consciousness and headaches. Remainder of comprehensive review is negative. Physical Exam :    Visit Vitals  /74   Pulse 80   Ht 6' 5\" (1.956 m)   Wt 82.6 kg (182 lb 3.2 oz)   SpO2 96%   BMI 21.61 kg/m²       General: Well defined, nourished, and groomed individual in no acute distress.    Neck: Supple, nontender, no bruits, no pain with resistance to active range of motion.    Musculoskeletal: Extremities revealed no edema and had full range of motion of joints.    Psych: Good mood and bright affect    NEUROLOGICAL EXAMINATION:    Mental Status: Alert and oriented to person, place, and time    Cranial Nerves:    II, III, IV, VI: Visual acuity grossly intact.  Visual fields are normal.    Pupils are equal, round, and reactive to light and accommodation.    Extra-ocular movements are full and fluid. Fundoscopic exam was benign, no ptosis or nystagmus.    V-XII: Hearing is grossly intact. Facial features are symmetric, with normal sensation and strength. The palate rises symmetrically and the tongue protrudes midline. Sternocleidomastoids 5/5. Motor Examination: Normal tone, bulk, and strength, 5/5 muscle strength throughout. Coordination: Finger to nose was normal. No resting or intention tremor    Gait and Station: Steady while walking. Normal arm swing. No pronator drift. No muscle wasting or fasiculations noted. Reflexes: DTRs 2+ throughout. Results for orders placed or performed during the hospital encounter of 08/02/21   TROPONIN I   Result Value Ref Range    Troponin-I, Qt. <0.05 <0.05 ng/mL   MAGNESIUM   Result Value Ref Range    Magnesium 2.1 1.6 - 2.4 mg/dL   METABOLIC PANEL, BASIC   Result Value Ref Range    Sodium 140 136 - 145 mmol/L    Potassium 4.5 3.5 - 5.1 mmol/L    Chloride 104 97 - 108 mmol/L    CO2 28 21 - 32 mmol/L    Anion gap 8 5 - 15 mmol/L    Glucose 114 (H) 65 - 100 mg/dL    BUN 20 6 - 20 MG/DL    Creatinine 1.28 0.70 - 1.30 MG/DL    BUN/Creatinine ratio 16 12 - 20      GFR est AA >60 >60 ml/min/1.73m2    GFR est non-AA >60 >60 ml/min/1.73m2    Calcium 8.9 8.5 - 10.1 MG/DL   CBC WITH AUTOMATED DIFF   Result Value Ref Range    WBC 6.2 4.1 - 11.1 K/uL    RBC 4.92 4.10 - 5.70 M/uL    HGB 15.5 12.1 - 17.0 g/dL    HCT 43.1 36.6 - 50.3 %    MCV 87.6 80.0 - 99.0 FL    MCH 31.5 26.0 - 34.0 PG    MCHC 36.0 30.0 - 36.5 g/dL    RDW 11.9 11.5 - 14.5 %    PLATELET 902 578 - 914 K/uL    MPV 9.2 8.9 - 12.9 FL    NRBC 0.0 0.0  WBC    ABSOLUTE NRBC 0.00 0.00 - 0.01 K/uL    NEUTROPHILS 84 (H) 32 - 75 %    LYMPHOCYTES 10 (L) 12 - 49 %    MONOCYTES 6 5 - 13 %    EOSINOPHILS 0 0 - 7 %    BASOPHILS 0 0 - 1 %    IMMATURE GRANULOCYTES 0 0 - 0.5 %    ABS. NEUTROPHILS 5.2 1.8 - 8.0 K/UL    ABS. LYMPHOCYTES 0.6 (L) 0.8 - 3.5 K/UL    ABS. MONOCYTES 0.4 0.0 - 1.0 K/UL    ABS. EOSINOPHILS 0.0 0.0 - 0.4 K/UL    ABS. BASOPHILS 0.0 0.0 - 0.1 K/UL    ABS. IMM. GRANS. 0.0 0.00 - 0.04 K/UL    DF SMEAR SCANNED      RBC COMMENTS NORMOCYTIC, NORMOCHROMIC     METABOLIC PANEL, BASIC   Result Value Ref Range    Sodium 140 136 - 145 mmol/L    Potassium 4.0 3.5 - 5.1 mmol/L    Chloride 109 (H) 97 - 108 mmol/L    CO2 27 21 - 32 mmol/L    Anion gap 4 (L) 5 - 15 mmol/L    Glucose 108 (H) 65 - 100 mg/dL    BUN 14 6 - 20 MG/DL    Creatinine 1.06 0.70 - 1.30 MG/DL    BUN/Creatinine ratio 13 12 - 20      GFR est AA >60 >60 ml/min/1.73m2    GFR est non-AA >60 >60 ml/min/1.73m2    Calcium 9.0 8.5 - 10.1 MG/DL   CBC W/O DIFF   Result Value Ref Range    WBC 5.9 4.1 - 11.1 K/uL    RBC 4.70 4. 10 - 5.70 M/uL    HGB 14.9 12.1 - 17.0 g/dL    HCT 41.9 36.6 - 50.3 %    MCV 89.1 80.0 - 99.0 FL    MCH 31.7 26.0 - 34.0 PG    MCHC 35.6 30.0 - 36.5 g/dL    RDW 12.0 11.5 - 14.5 %    PLATELET 381 423 - 754 K/uL    MPV 8.9 8.9 - 12.9 FL    NRBC 0.0 0  WBC    ABSOLUTE NRBC 0.00 0.00 - 0.01 K/uL   LIPID PANEL   Result Value Ref Range    Cholesterol, total 163 <200 MG/DL    Triglyceride 90 <150 MG/DL    HDL Cholesterol 48 MG/DL    LDL, calculated 97 0 - 100 MG/DL    VLDL, calculated 18 MG/DL    CHOL/HDL Ratio 3.4 0.0 - 5.0     HEMOGLOBIN A1C WITH EAG   Result Value Ref Range    Hemoglobin A1c 4.9 4.0 - 5.6 %    Est. average glucose 94 mg/dL   CBC WITH AUTOMATED DIFF   Result Value Ref Range    WBC 5.4 4.1 - 11.1 K/uL    RBC 4.89 4. 10 - 5.70 M/uL    HGB 15.4 12.1 - 17.0 g/dL    HCT 43.4 36.6 - 50.3 %    MCV 88.8 80.0 - 99.0 FL    MCH 31.5 26.0 - 34.0 PG    MCHC 35.5 30.0 - 36.5 g/dL    RDW 11.9 11.5 - 14.5 %    PLATELET 907 388 - 079 K/uL    MPV 8.6 (L) 8.9 - 12.9 FL    NRBC 0.0 0  WBC    ABSOLUTE NRBC 0.00 0.00 - 0.01 K/uL    NEUTROPHILS 73 32 - 75 %    LYMPHOCYTES 20 12 - 49 %    MONOCYTES 6 5 - 13 %    EOSINOPHILS 1 0 - 7 %    BASOPHILS 0 0 - 1 %    IMMATURE GRANULOCYTES 0 0.0 - 0.5 %    ABS. NEUTROPHILS 3.9 1.8 - 8.0 K/UL    ABS. LYMPHOCYTES 1.1 0.8 - 3.5 K/UL    ABS. MONOCYTES 0.3 0.0 - 1.0 K/UL    ABS. EOSINOPHILS 0.1 0.0 - 0.4 K/UL    ABS. BASOPHILS 0.0 0.0 - 0.1 K/UL    ABS. IMM. GRANS. 0.0 0.00 - 0.04 K/UL    DF AUTOMATED     METABOLIC PANEL, COMPREHENSIVE   Result Value Ref Range    Sodium 140 136 - 145 mmol/L    Potassium 4.0 3.5 - 5.1 mmol/L    Chloride 109 (H) 97 - 108 mmol/L    CO2 27 21 - 32 mmol/L    Anion gap 4 (L) 5 - 15 mmol/L    Glucose 107 (H) 65 - 100 mg/dL    BUN 14 6 - 20 MG/DL    Creatinine 1.10 0.70 - 1.30 MG/DL    BUN/Creatinine ratio 13 12 - 20      GFR est AA >60 >60 ml/min/1.73m2    GFR est non-AA >60 >60 ml/min/1.73m2    Calcium 8.4 (L) 8.5 - 10.1 MG/DL    Bilirubin, total 0.9 0.2 - 1.0 MG/DL    ALT (SGPT) 93 (H) 12 - 78 U/L    AST (SGOT) 107 (H) 15 - 37 U/L    Alk.  phosphatase 70 45 - 117 U/L    Protein, total 6.8 6.4 - 8.2 g/dL    Albumin 4.0 3.5 - 5.0 g/dL    Globulin 2.8 2.0 - 4.0 g/dL    A-G Ratio 1.4 1.1 - 2.2     FACTOR V LEIDEN   Result Value Ref Range    Factor V Leiden Comment      Comment <<DO NOT REPORT>>     FACTOR II  DNA ANALYSIS   Result Value Ref Range    Factor II, DNA Analysis Comment      Additional information <<DO NOT REPORT>>     PROTEIN C, TOTAL   Result Value Ref Range    Protein C Antigen 90 60 - 150 %   PROTEIN C ACTIVITY   Result Value Ref Range    Protein C-Functional 109 73 - 180 %   PROTEIN S ANTIGEN   Result Value Ref Range    Protein S, Total 66 60 - 150 %    Protein S, Free 103 57 - 157 %   PROTEIN S, FUNCTIONAL   Result Value Ref Range    Protein S-Functional 99 63 - 140 %   ANTITHROMBIN III ACTIVITY   Result Value Ref Range    Antithrombin Activity 105 75 - 135 %   LUPUS ANTICOAGULANT PANEL W/ REFLEX   Result Value Ref Range    PTT-LA 45.5 0.0 - 51.9 sec    dRVVT 35.8 0.0 - 47.0 sec    Interpretation Comment:    BETA-2 GLYCOPROTEIN I ABS   Result Value Ref Range    Beta-2 Glycoprotein I Ab, IgG <9 0 - 20 GPI IgG units    Beta-2 Glycoprotein I Ab, IgA <9 0 - 25 GPI IgA units    Beta-2 Glycoprotein I Ab, IgM <9 0 - 32 GPI IgM units   CARDIOLIPIN AB PANEL   Result Value Ref Range    Cardiolipin Ab, IgG 11 0 - 14 GPL U/mL    Cardiolipin Ab, IgM <9 0 - 12 MPL U/mL    Cardiolipin Ab, IgA <9 0 - 11 APL U/mL   FACTOR VIII   Result Value Ref Range    Factor VIII 123 80 - 200 %   SAMPLES BEING HELD   Result Value Ref Range    SAMPLES BEING HELD 1LAV     COMMENT        Add-on orders for these samples will be processed based on acceptable specimen integrity and analyte stability, which may vary by analyte.    EKG, 12 LEAD, INITIAL   Result Value Ref Range    Ventricular Rate 76 BPM    Atrial Rate 76 BPM    P-R Interval 204 ms    QRS Duration 94 ms    Q-T Interval 384 ms    QTC Calculation (Bezet) 432 ms    Calculated P Axis 84 degrees    Calculated R Axis 82 degrees    Calculated T Axis 62 degrees    Diagnosis       Normal sinus rhythm with 1st degree AV block  Possible Left atrial enlargement  Incomplete right bundle branch block  Borderline ECG    Confirmed by Laura Benitez MD, JUAN DANIEL ([id number]) on 8/2/2021 11:14:44 AM     EXERCISE CARDIAC STRESS TEST   Result Value Ref Range    Target  bpm    Exercise duration time 00:12:55     Stress Base Systolic  mmHg    Stress Base Diastolic BP 90 mmHg    Post peak  BPM    Baseline HR 61 BPM    Estimated workload 16.8 METS    Baseline  mmHg    Percent HR 96 %    Angina Index 0     Stress Base Diastolic BP 84 mmHg    Stress Rate Pressure Product 27,040 BPM*mmHg   ECHO ADULT COMPLETE   Result Value Ref Range    IVSd 0.81 0.60 - 1.00 cm    LVIDd 4.54 4.20 - 5.90 cm    LVIDs 3.20 cm    LVOT d 2.42 cm    LVPWd 0.75 0.60 - 1.00 cm    LVOT Peak Gradient 3.04 mmHg    LVOT Peak Velocity 87.19 cm/s    RVIDd 2.99 cm    Left Atrium Major Axis 2.56 cm    LA Volume 32.73 18.0 - 58.0 mL    LA Area 4C 12.68 cm2    LA Vol 2C 28.20 18.00 - 58.00 mL    LA Vol 4C 25.65 18.00 - 58.00 mL    LA Volume DISK BP 28.00 18.0 - 58.0 mL    Aortic Valve Area by Continuity of Peak Velocity 4.06 cm2    AoV PG 3.92 mmHg    Aortic Valve Systolic Peak Velocity 11.47 cm/s    MV A [place] 53.52 centimeter/second    Mitral Valve E Wave Deceleration Time 281.08 ms    MV E [place] 72.63 centimeter/second    LV E' Lateral Velocity 14.64 centimeter/second    LV E' Septal Velocity 13.92 centimeter/second    Mitral Valve Pressure Half-time 81.51 ms    MVA (PHT) 2.70 cm2    Pulmonic Valve Systolic Peak Instantaneous Gradient 2.93 mmHg    Pulmonic Valve Max Velocity 85.53 cm/s    Tapse 2.23 (A) 1.50 - 2.00 cm    Triscuspid Valve Regurgitation Peak Gradient 20.64 mmHg    TR Max Velocity 227.15 cm/s    AO ASC D 3.34 cm    Ao Root D 3.60 cm    IVC proximal 1.98 cm    LV Mass .6 88.0 - 224.0 g    LV Mass AL Index 52.6 49.0 - 115.0 g/m2    Left Atrium Minor Axis 1.21 cm    LA Vol Index 15.44 16.00 - 28.00 ml/m2    LA Vol Index 13.30 16.00 - 28.00 ml/m2    LA Vol Index 12.10 16.00 - 28.00 ml/m2    ODETTE/BSA Pk [place] 1.9 cm2/m2       Orders Placed This Encounter    REFERRAL TO NEUROLOGY     Referral Priority:   Routine     Referral Type:   Consultation     Referral Reason:   Specialty Services Required     Referred to Provider:   Nicky Dunne MD     Number of Visits Requested:   1       1. Syncope, unspecified syncope type        Discussed MRI in full, EEG and blood. Following with cardiac and just had monitor taken off    Discussed symptoms and will refer to ANS testing to evaluate for orthostatic hypotension, POTS, or ANS dysfunction as cause  Today neurologically stable. No deficits on exam. No symptoms. Neurologically stable to drive.      FU after ANS              This note will not be viewable in [facility]

## 2021-09-09 ENCOUNTER — OFFICE VISIT (OUTPATIENT)
Dept: CARDIOLOGY CLINIC | Age: 46
End: 2021-09-09
Payer: COMMERCIAL

## 2021-09-09 VITALS
HEART RATE: 77 BPM | HEIGHT: 77 IN | DIASTOLIC BLOOD PRESSURE: 86 MMHG | BODY MASS INDEX: 21.96 KG/M2 | WEIGHT: 186 LBS | SYSTOLIC BLOOD PRESSURE: 124 MMHG | OXYGEN SATURATION: 97 %

## 2021-09-09 DIAGNOSIS — R55 SYNCOPE, UNSPECIFIED SYNCOPE TYPE: Primary | ICD-10-CM

## 2021-09-09 PROCEDURE — 99214 OFFICE O/P EST MOD 30 MIN: CPT | Performed by: SPECIALIST

## 2021-09-09 NOTE — PATIENT INSTRUCTIONS
1) consider buying on [employer] or [employer] to monitor heart rhythm    2) on your EKG you do have a first-degree heart block with a incomplete right bundle branch block which is not concerning    3) follow-up with me in 6 months or as needed.

## 2021-09-09 NOTE — PROGRESS NOTES
Birgit Talley is a 42 y.o. male    Visit Vitals  /86 (BP 1 Location: Left upper arm, BP Patient Position: Sitting, BP Cuff Size: Adult)   Pulse 77   Ht 6' 5\" (1.956 m)   Wt 186 lb (84.4 kg)   SpO2 97%   BMI 22.06 kg/m²       Chief Complaint   Patient presents with    Dizziness       Chest pain NO  SOB NO  Dizziness NO  Swelling NO  Recent hospital visit YES, [facility], SYNCOPE 8/2/21  Refills NO  COVID VACCINE STATUS YES

## 2021-09-09 NOTE — PROGRESS NOTES
CARDIOLOGY OFFICE NOTE    Elmer Erazo MD, [address], [place], [address] [facility]  Phone [phone]; Fax [phone]  Mobile [phone]   Voice Mail [phone]    Primary care: Serene Crystal MD       ATTENTION:   This medical record was transcribed using an electronic medical records/speech recognition system. Although proofread, it may and can contain electronic, spelling and other errors. Corrections may be executed at a later time. Please feel free to contact us for any clarifications as needed. ICD-10-CM ICD-9-CM   1. Syncope, unspecified syncope type  R55 780.2            Jamie Manuel is a 42 y.o. male with  referred for syncope. Cardiac risk factors: dyslipidemia, male gender  I have personally obtained the history from the patient. HISTORY OF PRESENTING ILLNESS    Jamie Manuel is a 42 y.o. male I am seeing for syncope. He said he was jogging and suddenly felt dizzy. He was recently hospitalized. He was running and on a 2 mile run. He was 2 miles L when it happened he they felt that maybe he was dehydrated. He does have some dizziness at times but nothing of significance. They are drinking more water and he did take a walk with his wife I was wearing the monitor did not show any arrhythmias. Cardiac risk factors: dyslipidemia, male gender. ACTIVE PROBLEM LIST     Patient Active Problem List    Diagnosis Date Noted    Acute stroke due to ischemia ([facility] [address] 08/03/2021    Syncope 08/02/2021           PAST MEDICAL HISTORY     Past Medical History:   Diagnosis Date    GERD (gastroesophageal reflux disease)            PAST SURGICAL HISTORY     No past surgical history on file. ALLERGIES     No Known Allergies       FAMILY HISTORY     No family history on file.  negative for cardiac disease       SOCIAL HISTORY     Social History     Socioeconomic History    Marital status:      Spouse name: Not on file    Number of children: Not on file    Years of education: Not on file    Highest education level: Not on file   Tobacco Use    Smoking status: Never Smoker    Smokeless tobacco: Never Used   Substance and Sexual Activity    Alcohol use: Yes     Alcohol/week: 6.0 standard drinks     Types: 6 Cans of beer per week    Drug use: Never     Social Determinants of Health     Financial Resource Strain:     Difficulty of Paying Living Expenses:    Food Insecurity:     Worried About Running Out of Food in the Last Year:     [address] in the Last Year:    Transportation Needs:     Lack of Transportation (Medical):  Lack of Transportation (Non-Medical):    Physical Activity:     Days of Exercise per Week:     Minutes of Exercise per Session:    Stress:     Feeling of Stress :    Social Connections:     Frequency of Communication with Friends and Family:     Frequency of Social Gatherings with Friends and Family:     Attends Anab[employer]ism Services:     Active Member of Clubs or Organizations:     Attends Club or Organization Meetings:     Marital Status:          MEDICATIONS     Current Outpatient Medications   Medication Sig    acetaminophen (TYLENOL) 325 mg tablet Take 2 Tablets by mouth every six (6) hours as needed for Pain or Fever.  aspirin 81 mg chewable tablet Take 1 Tablet by mouth daily. OTC    atorvastatin (LIPITOR) 40 mg tablet Take 1 Tablet by mouth nightly.  omeprazole (PRILOSEC) 40 mg capsule Take 40 mg by mouth daily. No current facility-administered medications for this visit. I have reviewed the nurses notes, vitals, problem list, allergy list, medical history, family, social history and medications. REVIEW OF SYMPTOMS      General: Pt denies excessive weight gain or loss. Pt is able to conduct ADL's  HEENT: Denies blurred vision, headaches, hearing loss, epistaxis and difficulty swallowing.   Respiratory: Denies cough, congestion, shortness of breath, FINCH, wheezing or stridor. Cardiovascular: Denies precordial pain, palpitations, edema or PND  Gastrointestinal: Denies poor appetite, indigestion, abdominal pain or blood in stool  Genitourinary: Denies hematuria, dysuria, increased urinary frequency  Musculoskeletal: Denies joint pain or swelling from muscles or joints  Neurologic: Denies tremor, paresthesias, headache, or sensory motor disturbance  Psychiatric: Denies confusion, insomnia, depression  Integumentray: Denies rash, itching or ulcers. Hematologic: Denies easy bruising, bleeding     PHYSICAL EXAMINATION      Vitals:    09/09/21 0912   BP: 124/86   Pulse: 77   SpO2: 97%   Weight: 186 lb (84.4 kg)   Height: 6' 5\" (1.956 m)     General: Well developed, in no acute distress. HEENT: No jaundice, oral mucosa moist, no oral ulcers  Neck: Supple, no stiffness, no lymphadenopathy, supple  Heart:  Normal S1/S2 negative S3 or S4. Regular, no murmur, gallop or rub, no jugular venous distention  Respiratory: Clear bilaterally x 4, no wheezing or rales  Abdomen:   Soft, non-tender, bowel sounds are active. Extremities:  No edema, normal cap refill, no cyanosis. Musculoskeletal: No clubbing, no deformities  Neuro: A&Ox3, speech clear, gait stable, cooperative, no focal neurologic deficits  Skin: Skin color is normal. No rashes or lesions. Non diaphoretic, moist.  Vascular: 2+ pulses symmetric in all extremities             DIAGNOSTIC DATA     1. Lipids   8/3/21- , HDL 48, LDL 97, TG 90     2. Echo   8/3/21- EF 50 - 55%, Agitated saline contrast study was performed. There was no shunting at baseline, with provocation or with Valsalva.     3. Stress Test  8/3/21-Treadmill-normal, mets 16.8, 12:55 min         LABORATORY DATA            Lab Results   Component Value Date/Time    WBC 5.4 08/03/2021 08:09 AM    Hemoglobin (POC) 12.9 11/05/2009 04:54 PM    HGB 15.4 08/03/2021 08:09 AM    Hematocrit (POC) 38 11/05/2009 04:54 PM    HCT 43.4 08/03/2021 08:09 AM PLATELET 255 83/36/0866 08:09 AM    MCV 88.8 08/03/2021 08:09 AM      Lab Results   Component Value Date/Time    Sodium 140 08/03/2021 08:09 AM    Potassium 4.0 08/03/2021 08:09 AM    Chloride 109 (H) 08/03/2021 08:09 AM    CO2 27 08/03/2021 08:09 AM    Anion gap 4 (L) 08/03/2021 08:09 AM    Glucose 107 (H) 08/03/2021 08:09 AM    BUN 14 08/03/2021 08:09 AM    Creatinine 1.10 08/03/2021 08:09 AM    BUN/Creatinine ratio 13 08/03/2021 08:09 AM    GFR est AA >60 08/03/2021 08:09 AM    GFR est non-AA >60 08/03/2021 08:09 AM    Calcium 8.4 (L) 08/03/2021 08:09 AM    Bilirubin, total 0.9 08/03/2021 08:09 AM    Alk. phosphatase 70 08/03/2021 08:09 AM    Protein, total 6.8 08/03/2021 08:09 AM    Albumin 4.0 08/03/2021 08:09 AM    Globulin 2.8 08/03/2021 08:09 AM    A-G Ratio 1.4 08/03/2021 08:09 AM    ALT (SGPT) 93 (H) 08/03/2021 08:09 AM           ASSESSMENT/RECOMMENDATIONS:.     1.  Syncope  -Cardiac work-up has been negative thus far. His EKG she does not demonstrate any significant interval abnormalities no prolonged QT and he does have a slight first-degree heart block but is minimal of 204 ms.  -He wore an event loop monitor for about 28 days with 1 PVC was noted. -Encouraged him to get Shelda Schiller or [employer] on [employer] and monitor his rhythm  - I believe he probably has high vagal tone that contributed to the symptoms since volume on prior to the episode he was nauseous through up and then he passed out.  -Courage to increase his fluid intake  -We will have a tilt table study done by neurology next week  -Should he have another episode consider ILR  2. Elevated cholesterol profile  -Last cholesterol was excellent on statin. 3.  Follow-up with me in 1 year or as needed    No orders of the defined types were placed in this encounter. We discussed the expected course, resolution and complications of the diagnosis(es) in detail.   Medication risks, benefits, costs, interactions, and alternatives were discussed as indicated. I advised him to contact the office if his condition worsens, changes or fails to improve as anticipated. He expressed understanding with the diagnosis(es) and plan          Follow-up and Dispositions  ·   Return in about 6 months (around 3/9/2022). I have discussed the diagnosis with  Alana Wheeler and the intended plan as seen in the above orders. Questions were answered concerning future plans. I have discussed medication side effects and warnings with the patient as well. Thank you,  Babak Cardenas MD for involving me in the care of  Alana Wheeler. Please do not hesitate to contact me for further questions/concerns. Elmer Hilliard MD, [address]      [facility], [address] [place] [address] [address]      [phone] / [phone] Fax

## 2021-09-16 ENCOUNTER — OFFICE VISIT (OUTPATIENT)
Dept: NEUROLOGY | Age: 46
End: 2021-09-16
Payer: COMMERCIAL

## 2021-09-16 DIAGNOSIS — R55 SYNCOPE, UNSPECIFIED SYNCOPE TYPE: Primary | ICD-10-CM

## 2021-09-16 PROCEDURE — 95924 ANS PARASYMP & SYMP W/TILT: CPT | Performed by: PSYCHIATRY & NEUROLOGY

## 2021-09-16 PROCEDURE — 95923 AUTONOMIC NRV SYST FUNJ TEST: CPT | Performed by: PSYCHIATRY & NEUROLOGY

## 2021-09-22 ENCOUNTER — PATIENT MESSAGE (OUTPATIENT)
Dept: NEUROLOGY | Age: 46
End: 2021-09-22

## 2021-09-22 NOTE — PROCEDURES
[facility]  [address] [place], [address] [place], [address] [facility]  Phone: [phone]  FAX: [phone]     Clinical Autonomic Testing Report     Patient ID:  Cassia Sellers  [id number]  30 y.o.  1975     REFERRED BY: Arnold Au NP  PCP: Pavithra Rai MD    Date of Testin2021     Indication/History:     Since , patient noted passing out while jogging due to nausea. (+) alcohol    Patient is coming for syncope/autonomic dysfunction evaluation. Medications taken 48 hrs before the test: None     Procedure: This Autonomic Nervous System (ANS) testing is performed by utilizing [address] [place] [address] wutabout Autonomic System, with established protocol. Multiple procedures performed: Heart rate response to deep breathing (HRDB), Valsalva ratio, Heart rate and BP response to head up tilt (HUT), and Quantitative sudomotor axon reflex testing (QSWEAT) . Result:  1. Heart response to deep  breathing (HRDB): 2 series of 6 cycles were performed and the mean of 6 consecutive cycles was obtained. Average HR difference was 15.86 and E:I ratio was 1.31. Normal respons   2. Heart rate response to Valsalva maneuver:  [id number] BP to Valsalva was measured and BP response in all 4 phases was normal. Heart response was the opposite of BP, a normal response. ( VR = 2.39 )  3. HUT (head-up tilt) : [id number] BP and HR were measured, up to 15 minutes post tilt. No significant BP reduction or HR acceleration/deceleration. 4. SUDOMOTOR: QSWEAT response showed relatively preserved sweat production in all 4 localities (forearm, proximal leg, distal leg, foot) of the right upper and lower limbs, comparing patient to age group. Impression:   NORMAL    Relatively preserved autonomic function for this age.          Keisha Zarate MD  Diplomate, American Board of Psychiatry and Neurology  Diplomate, Neuromuscular Medicine  Diplomate, American Board of Electrodiagnostic Medicine    Note: Raw Data will be scanned separately in Media

## 2022-03-16 NOTE — PATIENT INSTRUCTIONS

## 2022-03-16 NOTE — PROGRESS NOTES
CARDIOLOGY OFFICE NOTE    Elmer Liriano MD, [address], [place], [address] [facility]  Phone [phone]; Fax [phone]  Mobile [phone]   Voice Mail [phone]    Primary care: Jose Guzman MD       ATTENTION:   This medical record was transcribed using an electronic medical records/speech recognition system. Although proofread, it may and can contain electronic, spelling and other errors. Corrections may be executed at a later time. Please feel free to contact us for any clarifications as needed. ICD-10-CM ICD-9-CM   1. Syncope, unspecified syncope type  R55 780.2   2. Acute stroke due to ischemia [facility])  I63.9 434.91            Elva Chanel is a 41 y.o. male with  referred for syncope. Cardiac risk factors: dyslipidemia, male gender  I have personally obtained the history from the patient. HISTORY OF PRESENTING ILLNESS    Elva Chanel is a 42 y.o. male I am seeing for syncope. He said he was jogging and suddenly felt dizzy. He was recently hospitalized. He was running and on a 2 mile run. He was 2 miles L when it happened he they felt that maybe he was dehydrated. He does have some dizziness at times but nothing of significance. They are drinking more water and he did take a walk with his wife I was wearing the monitor did not show any arrhythmias. Cardiac risk factors: dyslipidemia, male gender. ACTIVE PROBLEM LIST     Patient Active Problem List    Diagnosis Date Noted    Acute stroke due to ischemia ([facility] [address] 08/03/2021    Syncope 08/02/2021           PAST MEDICAL HISTORY     Past Medical History:   Diagnosis Date    GERD (gastroesophageal reflux disease)            PAST SURGICAL HISTORY     No past surgical history on file. ALLERGIES     No Known Allergies       FAMILY HISTORY     No family history on file.  negative for cardiac disease       SOCIAL HISTORY     Social History     Socioeconomic History    Marital status:    Tobacco Use    Smoking status: Never Smoker    Smokeless tobacco: Never Used   Substance and Sexual Activity    Alcohol use: Yes     Alcohol/week: 6.0 standard drinks     Types: 6 Cans of beer per week    Drug use: Never         MEDICATIONS     Current Outpatient Medications   Medication Sig    omeprazole (PRILOSEC) 40 mg capsule Take 40 mg by mouth daily.  acetaminophen (TYLENOL) 325 mg tablet Take 2 Tablets by mouth every six (6) hours as needed for Pain or Fever. (Patient not taking: Reported on 3/17/2022)    aspirin 81 mg chewable tablet Take 1 Tablet by mouth daily. OTC (Patient not taking: Reported on 3/17/2022)    atorvastatin (LIPITOR) 40 mg tablet Take 1 Tablet by mouth nightly. (Patient not taking: Reported on 3/17/2022)     No current facility-administered medications for this visit. I have reviewed the nurses notes, vitals, problem list, allergy list, medical history, family, social history and medications. REVIEW OF SYMPTOMS    pertinent per HPI  General: Pt denies excessive weight gain or loss. Pt is able to conduct ADL's  HEENT: Denies blurred vision, headaches, hearing loss, epistaxis and difficulty swallowing. Respiratory: Denies cough, congestion, shortness of breath, FINCH, wheezing or stridor. Cardiovascular: Denies precordial pain, palpitations, edema or PND  Gastrointestinal: Denies poor appetite, indigestion, abdominal pain or blood in stool  Genitourinary: Denies hematuria, dysuria, increased urinary frequency  Musculoskeletal: Denies joint pain or swelling from muscles or joints  Neurologic: Denies tremor, paresthesias, headache, or sensory motor disturbance  Psychiatric: Denies confusion, insomnia, depression  Integumentray: Denies rash, itching or ulcers.   Hematologic: Denies easy bruising, bleeding     PHYSICAL EXAMINATION      Vitals:    03/17/22 0947   BP: 130/88   Pulse: 67   SpO2: 97%   Weight: 195 lb (88.5 kg)   Height: 6' 5\" (1.956 m)     General: Well developed, in no acute distress. HEENT: No jaundice, oral mucosa moist, no oral ulcers  Neck: Supple, no stiffness, no lymphadenopathy, supple  Heart:  Normal S1/S2 negative S3 or S4. Regular, no murmur, gallop or rub, no jugular venous distention  Respiratory: Clear bilaterally x 4, no wheezing or rales  Extremities:  No edema, normal cap refill, no cyanosis. Musculoskeletal: No clubbing, no deformities  Neuro: A&Ox3, speech clear, gait stable, cooperative, no focal neurologic deficits  Skin: Skin color is normal. No rashes or lesions. Non diaphoretic, moist.           DIAGNOSTIC DATA     1. Lipids   8/3/21- , HDL 48, LDL 97, TG 90     2. Echo   8/3/21- EF 50 - 55%, Agitated saline contrast study was performed. There was no shunting at baseline, with provocation or with Valsalva. 3. Stress Test  8/3/21-Treadmill-normal, mets 16.8, 12:55 min    4. Loop  8/3/21-9/1/21-· 3 events were transmitted.  2 symptomatic; 1 device triggered  · Patient monitored for 28d 7h 46m  · 6,635 PACs with PAC burden of <1%  · 1,981 PVCs with PVC burden of 1%         LABORATORY DATA            Lab Results   Component Value Date/Time    WBC 5.4 08/03/2021 08:09 AM    Hemoglobin (POC) 12.9 11/05/2009 04:54 PM    HGB 15.4 08/03/2021 08:09 AM    Hematocrit (POC) 38 11/05/2009 04:54 PM    HCT 43.4 08/03/2021 08:09 AM    PLATELET 804 91/71/5304 08:09 AM    MCV 88.8 08/03/2021 08:09 AM      Lab Results   Component Value Date/Time    Sodium 140 08/03/2021 08:09 AM    Potassium 4.0 08/03/2021 08:09 AM    Chloride 109 (H) 08/03/2021 08:09 AM    CO2 27 08/03/2021 08:09 AM    Anion gap 4 (L) 08/03/2021 08:09 AM    Glucose 107 (H) 08/03/2021 08:09 AM    BUN 14 08/03/2021 08:09 AM    Creatinine 1.10 08/03/2021 08:09 AM    BUN/Creatinine ratio 13 08/03/2021 08:09 AM    GFR est AA >60 08/03/2021 08:09 AM    GFR est non-AA >60 08/03/2021 08:09 AM    Calcium 8.4 (L) 08/03/2021 08:09 AM    Bilirubin, total 0.9 08/03/2021 08:09 AM    Alk. phosphatase 70 08/03/2021 08:09 AM    Protein, total 6.8 08/03/2021 08:09 AM    Albumin 4.0 08/03/2021 08:09 AM    Globulin 2.8 08/03/2021 08:09 AM    A-G Ratio 1.4 08/03/2021 08:09 AM    ALT (SGPT) 93 (H) 08/03/2021 08:09 AM           ASSESSMENT/RECOMMENDATIONS:.     1.  Syncope  -Cardiac work-up has been negative thus far. His EKG she does not demonstrate any significant interval abnormalities no prolonged QT and he does have a slight first-degree heart block but is minimal of 204 ms.  -He wore an event loop monitor for about 28 days with 1 PVC was noted.  -I believe he had a tilt table study and all that was negative  -He believes it was secondary to dehydration  2. Elevated cholesterol profile  -He was on Lipitor in the past and they do need to follow his cholesterol. I am not sure the 1 checked on 8/3/2021 was on atorvastatin or not. LDL goal should be under 100.  3.  Follow-up with me in 1 year or as needed    No orders of the defined types were placed in this encounter. We discussed the expected course, resolution and complications of the diagnosis(es) in detail. Medication risks, benefits, costs, interactions, and alternatives were discussed as indicated. I advised him to contact the office if his condition worsens, changes or fails to improve as anticipated. He expressed understanding with the diagnosis(es) and plan          Follow-up and Dispositions  ·   Return in about 6 months (around 9/17/2022). I have discussed the diagnosis with  Ying Alfaro and the intended plan as seen in the above orders. Questions were answered concerning future plans. I have discussed medication side effects and warnings with the patient as well. Thank you,  Kelly Foster MD for involving me in the care of  Ying Alfaro. Please do not hesitate to contact me for further questions/concerns. Elmer Hilliard MD, [address] [facility] [address] [facility], [address] [place] [address]     [address] [place] [address]      [phone] / [phone] Fax

## 2022-03-17 ENCOUNTER — OFFICE VISIT (OUTPATIENT)
Dept: CARDIOLOGY CLINIC | Age: 47
End: 2022-03-17

## 2022-03-17 VITALS
SYSTOLIC BLOOD PRESSURE: 130 MMHG | DIASTOLIC BLOOD PRESSURE: 88 MMHG | BODY MASS INDEX: 23.02 KG/M2 | HEIGHT: 77 IN | HEART RATE: 67 BPM | OXYGEN SATURATION: 97 % | WEIGHT: 195 LBS

## 2022-03-17 DIAGNOSIS — I63.9 ACUTE STROKE DUE TO ISCHEMIA (HCC): ICD-10-CM

## 2022-03-17 DIAGNOSIS — R55 SYNCOPE, UNSPECIFIED SYNCOPE TYPE: Primary | ICD-10-CM

## 2022-03-17 PROCEDURE — 99214 OFFICE O/P EST MOD 30 MIN: CPT | Performed by: SPECIALIST

## 2022-03-17 NOTE — LETTER
3/17/2022    Patient: Mukund Conner   YOB: 1975   Date of Visit: 3/17/2022     Angel Oneil, [address] [phone]  Via Fax: [phone]    Dear Angel Oneil MD,      Thank you for referring Mr. Mukund Conner to [address] for evaluation. My notes for this consultation are attached. If you have questions, please do not hesitate to call me. I look forward to following your patient along with you.       Sincerely,    Oscar Veronica MD

## 2022-03-17 NOTE — PROGRESS NOTES
Dudley Harris is a 41 y.o. male    Visit Vitals  /88 (BP 1 Location: Left upper arm, BP Patient Position: Sitting, BP Cuff Size: Adult)   Pulse 67   Ht 6' 5\" (1.956 m)   Wt 195 lb (88.5 kg)   SpO2 97%   BMI 23.12 kg/m²       Chief Complaint   Patient presents with    Cholesterol Problem    Dizziness     SYNCOPE       Chest pain NO  SOB NO  Dizziness NO  Swelling NO  Recent hospital visit NO  Refills NO  COVID VACCINE STATUS YES  HAD COVID?  YES